# Patient Record
Sex: FEMALE | Race: WHITE | NOT HISPANIC OR LATINO
[De-identification: names, ages, dates, MRNs, and addresses within clinical notes are randomized per-mention and may not be internally consistent; named-entity substitution may affect disease eponyms.]

---

## 2017-03-22 ENCOUNTER — APPOINTMENT (OUTPATIENT)
Dept: CARDIOLOGY | Facility: CLINIC | Age: 82
End: 2017-03-22

## 2017-07-24 PROBLEM — Z00.00 ENCOUNTER FOR PREVENTIVE HEALTH EXAMINATION: Status: ACTIVE | Noted: 2017-07-24

## 2017-08-10 ENCOUNTER — APPOINTMENT (OUTPATIENT)
Dept: CARDIOLOGY | Facility: CLINIC | Age: 82
End: 2017-08-10
Payer: MEDICARE

## 2017-08-10 ENCOUNTER — APPOINTMENT (OUTPATIENT)
Dept: CARDIOLOGY | Facility: CLINIC | Age: 82
End: 2017-08-10

## 2017-08-10 PROCEDURE — 93306 TTE W/DOPPLER COMPLETE: CPT

## 2017-08-14 ENCOUNTER — APPOINTMENT (OUTPATIENT)
Dept: CARDIOLOGY | Facility: CLINIC | Age: 82
End: 2017-08-14
Payer: MEDICARE

## 2017-08-14 PROCEDURE — 99214 OFFICE O/P EST MOD 30 MIN: CPT

## 2017-09-06 ENCOUNTER — APPOINTMENT (OUTPATIENT)
Dept: CARDIOLOGY | Facility: CLINIC | Age: 82
End: 2017-09-06
Payer: MEDICARE

## 2017-09-06 PROCEDURE — A9502: CPT

## 2017-09-06 PROCEDURE — 93015 CV STRESS TEST SUPVJ I&R: CPT

## 2017-09-06 PROCEDURE — 78452 HT MUSCLE IMAGE SPECT MULT: CPT

## 2017-09-13 ENCOUNTER — APPOINTMENT (OUTPATIENT)
Dept: CARDIOLOGY | Facility: CLINIC | Age: 82
End: 2017-09-13
Payer: MEDICARE

## 2017-09-13 PROCEDURE — 99214 OFFICE O/P EST MOD 30 MIN: CPT

## 2017-10-12 ENCOUNTER — APPOINTMENT (OUTPATIENT)
Dept: CARDIOLOGY | Facility: CLINIC | Age: 82
End: 2017-10-12

## 2018-01-23 ENCOUNTER — RX RENEWAL (OUTPATIENT)
Age: 83
End: 2018-01-23

## 2018-01-23 ENCOUNTER — RECORD ABSTRACTING (OUTPATIENT)
Age: 83
End: 2018-01-23

## 2018-02-02 ENCOUNTER — MOBILE ON CALL (OUTPATIENT)
Age: 83
End: 2018-02-02

## 2018-03-07 ENCOUNTER — APPOINTMENT (OUTPATIENT)
Dept: CARDIOLOGY | Facility: CLINIC | Age: 83
End: 2018-03-07
Payer: MEDICARE

## 2018-03-07 VITALS
BODY MASS INDEX: 24.8 KG/M2 | HEART RATE: 64 BPM | WEIGHT: 140 LBS | RESPIRATION RATE: 16 BRPM | HEIGHT: 63 IN | SYSTOLIC BLOOD PRESSURE: 100 MMHG | DIASTOLIC BLOOD PRESSURE: 60 MMHG | OXYGEN SATURATION: 98 %

## 2018-03-07 DIAGNOSIS — Z87.898 PERSONAL HISTORY OF OTHER SPECIFIED CONDITIONS: ICD-10-CM

## 2018-03-07 DIAGNOSIS — Z80.0 FAMILY HISTORY OF MALIGNANT NEOPLASM OF DIGESTIVE ORGANS: ICD-10-CM

## 2018-03-07 DIAGNOSIS — Z78.9 OTHER SPECIFIED HEALTH STATUS: ICD-10-CM

## 2018-03-07 DIAGNOSIS — Z86.39 PERSONAL HISTORY OF OTHER ENDOCRINE, NUTRITIONAL AND METABOLIC DISEASE: ICD-10-CM

## 2018-03-07 DIAGNOSIS — Z82.0 FAMILY HISTORY OF EPILEPSY AND OTHER DISEASES OF THE NERVOUS SYSTEM: ICD-10-CM

## 2018-03-07 DIAGNOSIS — R25.2 CRAMP AND SPASM: ICD-10-CM

## 2018-03-07 DIAGNOSIS — Z86.79 PERSONAL HISTORY OF OTHER DISEASES OF THE CIRCULATORY SYSTEM: ICD-10-CM

## 2018-03-07 DIAGNOSIS — I77.810 THORACIC AORTIC ECTASIA: ICD-10-CM

## 2018-03-07 PROCEDURE — 99214 OFFICE O/P EST MOD 30 MIN: CPT

## 2018-03-07 RX ORDER — LATANOPROST/PF 0.005 %
0.01 DROPS OPHTHALMIC (EYE)
Refills: 0 | Status: ACTIVE | COMMUNITY

## 2018-03-07 RX ORDER — LEVOTHYROXINE SODIUM 0.12 MG/1
125 TABLET ORAL DAILY
Refills: 0 | Status: ACTIVE | COMMUNITY

## 2018-04-02 ENCOUNTER — MEDICATION RENEWAL (OUTPATIENT)
Age: 83
End: 2018-04-02

## 2018-04-05 ENCOUNTER — MEDICATION RENEWAL (OUTPATIENT)
Age: 83
End: 2018-04-05

## 2018-04-25 ENCOUNTER — CLINICAL ADVICE (OUTPATIENT)
Age: 83
End: 2018-04-25

## 2018-07-25 PROBLEM — Z78.9 ALCOHOL USE: Status: ACTIVE | Noted: 2018-03-07

## 2018-09-19 ENCOUNTER — APPOINTMENT (OUTPATIENT)
Dept: CARDIOLOGY | Facility: CLINIC | Age: 83
End: 2018-09-19
Payer: MEDICARE

## 2018-09-19 ENCOUNTER — NON-APPOINTMENT (OUTPATIENT)
Age: 83
End: 2018-09-19

## 2018-09-19 VITALS
SYSTOLIC BLOOD PRESSURE: 116 MMHG | OXYGEN SATURATION: 98 % | HEART RATE: 60 BPM | BODY MASS INDEX: 24.1 KG/M2 | HEIGHT: 63 IN | WEIGHT: 136 LBS | DIASTOLIC BLOOD PRESSURE: 62 MMHG

## 2018-09-19 PROCEDURE — 93306 TTE W/DOPPLER COMPLETE: CPT

## 2018-09-19 PROCEDURE — 99215 OFFICE O/P EST HI 40 MIN: CPT

## 2018-09-19 PROCEDURE — 93000 ELECTROCARDIOGRAM COMPLETE: CPT

## 2018-09-19 RX ORDER — ACETAMINOPHEN 500 MG
1000 TABLET ORAL
Refills: 0 | Status: ACTIVE | COMMUNITY

## 2018-09-19 RX ORDER — VITS A,C,E/LUTEIN/MINERALS 300MCG-200
TABLET ORAL
Refills: 0 | Status: ACTIVE | COMMUNITY

## 2018-09-19 RX ORDER — MULTIVITAMIN
TABLET ORAL
Refills: 0 | Status: ACTIVE | COMMUNITY

## 2018-09-19 RX ORDER — METOPROLOL TARTRATE 25 MG/1
25 TABLET, FILM COATED ORAL TWICE DAILY
Qty: 180 | Refills: 1 | Status: DISCONTINUED | COMMUNITY
Start: 1900-01-01 | End: 2018-09-19

## 2018-09-21 ENCOUNTER — RX RENEWAL (OUTPATIENT)
Age: 83
End: 2018-09-21

## 2018-10-04 ENCOUNTER — APPOINTMENT (OUTPATIENT)
Dept: CARDIOLOGY | Facility: CLINIC | Age: 83
End: 2018-10-04

## 2018-10-05 ENCOUNTER — APPOINTMENT (OUTPATIENT)
Dept: CARDIOLOGY | Facility: CLINIC | Age: 83
End: 2018-10-05
Payer: MEDICARE

## 2018-10-05 VITALS
DIASTOLIC BLOOD PRESSURE: 58 MMHG | BODY MASS INDEX: 24.1 KG/M2 | SYSTOLIC BLOOD PRESSURE: 116 MMHG | WEIGHT: 136 LBS | HEART RATE: 62 BPM | HEIGHT: 63 IN

## 2018-10-05 PROCEDURE — 99214 OFFICE O/P EST MOD 30 MIN: CPT

## 2018-10-23 ENCOUNTER — APPOINTMENT (OUTPATIENT)
Dept: CARDIOLOGY | Facility: CLINIC | Age: 83
End: 2018-10-23
Payer: MEDICARE

## 2018-10-23 VITALS
DIASTOLIC BLOOD PRESSURE: 58 MMHG | BODY MASS INDEX: 24.63 KG/M2 | HEART RATE: 66 BPM | SYSTOLIC BLOOD PRESSURE: 102 MMHG | HEIGHT: 63 IN | WEIGHT: 139 LBS

## 2018-10-23 PROCEDURE — 99214 OFFICE O/P EST MOD 30 MIN: CPT

## 2018-10-29 ENCOUNTER — MEDICATION RENEWAL (OUTPATIENT)
Age: 83
End: 2018-10-29

## 2019-02-28 ENCOUNTER — APPOINTMENT (OUTPATIENT)
Dept: CARDIOLOGY | Facility: CLINIC | Age: 84
End: 2019-02-28
Payer: MEDICARE

## 2019-02-28 VITALS
WEIGHT: 145 LBS | DIASTOLIC BLOOD PRESSURE: 64 MMHG | HEART RATE: 62 BPM | OXYGEN SATURATION: 96 % | HEIGHT: 63 IN | BODY MASS INDEX: 25.69 KG/M2 | SYSTOLIC BLOOD PRESSURE: 102 MMHG

## 2019-02-28 DIAGNOSIS — Z01.812 ENCOUNTER FOR PREPROCEDURAL LABORATORY EXAMINATION: ICD-10-CM

## 2019-02-28 PROCEDURE — 0296T: CPT

## 2019-02-28 PROCEDURE — 99214 OFFICE O/P EST MOD 30 MIN: CPT

## 2019-02-28 RX ORDER — CYANOCOBALAMIN (VITAMIN B-12) 1000 MCG
TABLET ORAL
Refills: 0 | Status: DISCONTINUED | COMMUNITY
End: 2019-02-28

## 2019-02-28 NOTE — HISTORY OF PRESENT ILLNESS
[FreeTextEntry1] : 83-year-old comes in for followup urgent consultation in presence of\par Continued intermittent shortness of breath wounds associated with feeling very dizzy, lightheaded, and fainting. She did not have any chest pressure or heaviness pain or palpitations. Symptoms resolved very quickly. Her shortness of breath is with exertion, as well as at rest. She has no PND, orthopnea, or pedal edema. No recent changes in medications.\par No claudication pain.\par Hospital admission for a cardiovascular reason recently.\par Stable. Weight.\par Recent hospitalization for gallbladder problems.\par \par Medical history includes\par Thoracic, aortic ectasia.\par Essential hypertension. Preserved systolic function. Nonsmoker. No renal insufficiency. No congestive heart failure.\par Coronary artery disease with coronary angiography recently showing proximal LAD moderate disease. On medical management.\par Mild to moderate mitral regurgitation.\par Hyperlipidemia. On statin therapy.\par Hypothyroidism.

## 2019-02-28 NOTE — DISCUSSION/SUMMARY
[FreeTextEntry1] : #1 coronary artery disease. Moderate proximal LAD. on Medical management. Continue aspirin. Continue atorvastatin. \par Coronary angiogram again reviewed. She has moderate proximal LAD disease. On medical management. Unable to decide whether any of her symptoms mainly of shortness of breath is any way related to LAD disease. Fractional flow reserve was not done during the coronary angiography in 2017 and 2018.\par Considering at present symptoms of dizziness and fainting. I have recommended her to have 2 weeks of ZIo patch to rule out any significant arrhythmia. If no significant arrhythmia in association with her intermittent shortness of breath, then would like her to have coronary angiography again, weight if needed FFR and intervention.\par If any resting symptoms. She will call 911.\par #2 essential hypertension. Well controlled. Continue angiotensin receptor blocker. Low-salt diet.\par #3 hyperlipidemia. Continue atorvastatin at 40 mg at present. Tolerating it well. Low saturated fat, carbohydrate intake.\par #4 mild-to-moderate mitral regurgitation. Thoracic aortic ectasia. Followup by echocardiogram is ordered. So far preserved systolic function, stable. LV dimension.\par \par Counseling regarding low saturated fat, salt and carbohydrate intake was reviewed. Active lifestyle and regular. Exercise along with weight management is advised.\par \par All the above were at length reviewed. Answered all the questions. Thank you very much for this kind referral. Please do not hesitate to give me a call for any question.\par Part of this transcription was done with voice recognition software and phonetically similar errors are common. I apologize for that. Please donot hesitate to call for any questions due to above.\par \par Followup in 1 month or for any change in her symptoms

## 2019-02-28 NOTE — REVIEW OF SYSTEMS
[Fever] : no fever [Headache] : no headache [Recent Weight Gain (___ Lbs)] : no recent weight gain [Chills] : no chills [Feeling Fatigued] : feeling fatigued [Recent Weight Loss (___ Lbs)] : no recent weight loss [Dyspnea on exertion] : dyspnea during exertion [Chest  Pressure] : no chest pressure [Chest Pain] : chest pain [Lower Ext Edema] : no extremity edema [Leg Claudication] : no intermittent leg claudication [Palpitations] : no palpitations [Joint Pain] : no joint pain [Joint Swelling] : no joint swelling [Joint Stiffness] : joint stiffness [Muscle Cramps] : no muscle cramps [Limb Weakness (Paresis)] : no limb weakness [see HPI] : see HPI [Dizziness] : dizziness [Tremor] : no tremor was seen [Numbness (Hypesthesia)] : no numbness [Convulsions] : no convulsions [Tingling (Paresthesia)] : no tingling [Negative] : Heme/Lymph

## 2019-02-28 NOTE — REASON FOR VISIT
[Acute Exacerbation] : an acute exacerbation of [Coronary Artery Disease] : coronary artery disease [Dyspnea] : dyspnea [Syncope] : syncope

## 2019-02-28 NOTE — CARDIOLOGY SUMMARY
[No Exercise Ind Arr] : no exercise induced arrhythmias [No Symptoms] : no Symptoms [___] : [unfilled] [LVEF ___%] : LVEF [unfilled]% [Mild] : mild LV dysfunction [None] : no pulmonary hypertension [Normal] : normal LA size [Moderate] : moderate mitral regurgitation

## 2019-02-28 NOTE — PHYSICAL EXAM
[General Appearance - Well Developed] : well developed [Normal Appearance] : normal appearance [Well Groomed] : well groomed [General Appearance - Well Nourished] : well nourished [No Deformities] : no deformities [General Appearance - In No Acute Distress] : no acute distress [Normal Conjunctiva] : the conjunctiva exhibited no abnormalities [Eyelids - No Xanthelasma] : the eyelids demonstrated no xanthelasmas [No Oral Pallor] : no oral pallor [Normal Jugular Venous A Waves Present] : normal jugular venous A waves present [Normal Jugular Venous V Waves Present] : normal jugular venous V waves present [No Jugular Venous Gill A Waves] : no jugular venous gill A waves [Respiration, Rhythm And Depth] : normal respiratory rhythm and effort [Exaggerated Use Of Accessory Muscles For Inspiration] : no accessory muscle use [Auscultation Breath Sounds / Voice Sounds] : lungs were clear to auscultation bilaterally [Heart Rate And Rhythm] : heart rate and rhythm were normal [Heart Sounds] : normal S1 and S2 [Arterial Pulses Normal] : the arterial pulses were normal [Edema] : no peripheral edema present [Veins - Varicosity Changes] : no varicosital changes were noted in the lower extremities [FreeTextEntry1] : watson 1/6 at the base [Abdomen Soft] : soft [Abnormal Walk] : normal gait [Nail Clubbing] : no clubbing of the fingernails [Cyanosis, Localized] : no localized cyanosis [Skin Color & Pigmentation] : normal skin color and pigmentation [] : no rash [Oriented To Time, Place, And Person] : oriented to person, place, and time [Affect] : the affect was normal [Mood] : the mood was normal [No Anxiety] : not feeling anxious

## 2019-03-22 PROCEDURE — 0298T: CPT

## 2019-03-27 ENCOUNTER — APPOINTMENT (OUTPATIENT)
Dept: CARDIOLOGY | Facility: CLINIC | Age: 84
End: 2019-03-27
Payer: MEDICARE

## 2019-03-27 VITALS
SYSTOLIC BLOOD PRESSURE: 110 MMHG | WEIGHT: 143 LBS | HEART RATE: 62 BPM | DIASTOLIC BLOOD PRESSURE: 64 MMHG | BODY MASS INDEX: 25.34 KG/M2 | HEIGHT: 63 IN | OXYGEN SATURATION: 98 %

## 2019-03-27 PROCEDURE — 99214 OFFICE O/P EST MOD 30 MIN: CPT

## 2019-03-27 NOTE — PHYSICAL EXAM
[General Appearance - Well Developed] : well developed [Normal Appearance] : normal appearance [Well Groomed] : well groomed [General Appearance - Well Nourished] : well nourished [No Deformities] : no deformities [General Appearance - In No Acute Distress] : no acute distress [Normal Conjunctiva] : the conjunctiva exhibited no abnormalities [Eyelids - No Xanthelasma] : the eyelids demonstrated no xanthelasmas [No Oral Pallor] : no oral pallor [Normal Jugular Venous A Waves Present] : normal jugular venous A waves present [Normal Jugular Venous V Waves Present] : normal jugular venous V waves present [No Jugular Venous Gill A Waves] : no jugular venous gill A waves [Respiration, Rhythm And Depth] : normal respiratory rhythm and effort [Exaggerated Use Of Accessory Muscles For Inspiration] : no accessory muscle use [Auscultation Breath Sounds / Voice Sounds] : lungs were clear to auscultation bilaterally [Heart Rate And Rhythm] : heart rate and rhythm were normal [Heart Sounds] : normal S1 and S2 [Arterial Pulses Normal] : the arterial pulses were normal [Edema] : no peripheral edema present [Veins - Varicosity Changes] : no varicosital changes were noted in the lower extremities [Abdomen Soft] : soft [Abnormal Walk] : normal gait [Nail Clubbing] : no clubbing of the fingernails [Cyanosis, Localized] : no localized cyanosis [Skin Color & Pigmentation] : normal skin color and pigmentation [] : no rash [Oriented To Time, Place, And Person] : oriented to person, place, and time [Affect] : the affect was normal [Mood] : the mood was normal [No Anxiety] : not feeling anxious [FreeTextEntry1] : watson 1/6 at the base

## 2019-03-27 NOTE — REVIEW OF SYSTEMS
[Feeling Fatigued] : feeling fatigued [Shortness Of Breath] : shortness of breath [Dyspnea on exertion] : dyspnea during exertion [Joint Stiffness] : joint stiffness [see HPI] : see HPI [Dizziness] : dizziness [Negative] : Heme/Lymph [Fever] : no fever [Headache] : no headache [Recent Weight Gain (___ Lbs)] : no recent weight gain [Chills] : no chills [Recent Weight Loss (___ Lbs)] : no recent weight loss [Chest  Pressure] : no chest pressure [Chest Pain] : no chest pain [Lower Ext Edema] : no extremity edema [Leg Claudication] : no intermittent leg claudication [Palpitations] : no palpitations [Joint Pain] : no joint pain [Joint Swelling] : no joint swelling [Muscle Cramps] : no muscle cramps [Limb Weakness (Paresis)] : no limb weakness [Tremor] : no tremor was seen [Numbness (Hypesthesia)] : no numbness [Convulsions] : no convulsions [Tingling (Paresthesia)] : no tingling

## 2019-03-27 NOTE — REASON FOR VISIT
[Follow-Up - Clinic] : a clinic follow-up of [Coronary Artery Disease] : coronary artery disease [Dyspnea] : dyspnea [Syncope] : syncope [FreeTextEntry2] : CAD.  SOB

## 2019-03-27 NOTE — ASSESSMENT
[FreeTextEntry1] : Besides coronary angiography, echocardiogram, myocardial perfusion scan as reviewed above.\par Labs February 3, 2018 had shown stable CBC, PSA, and a CMP. LDL 89. HDL 64. Triglycerides 72. Total cholesterol 167\par \par EKG Indication coronary artery disease, being dictation. Sinus bradycardia.

## 2019-03-27 NOTE — DISCUSSION/SUMMARY
[FreeTextEntry1] : #1 coronary artery disease. Moderate proximal LAD.  On medical management. Continue aspirin. Continue atorvastatin.  Worsening shortness of breath.  Questionable relation to LAD disease. Fractional flow reserve was not done during the coronary angiography in 2017 and 2018.  Recommend Rt and Lt cardiac catheterization with FFR to further evaluate LAD as culprit artery for symptoms as well as Rt heart pressures etc.  Brief NSVT noted on ambulatory monitoring.  If any resting symptoms. She will call 911.\par \par #2 essential hypertension. Well controlled. Continue angiotensin receptor blocker. Low-salt diet.\par \par #3 hyperlipidemia. Continue atorvastatin at 40 mg at present. Tolerating it well. Low saturated fat, carbohydrate intake.\par \par #4 mild-to-moderate mitral regurgitation. Thoracic aortic ectasia. Followup by echocardiogram is ordered. So far preserved systolic function, stable. LV dimension.\par \par Counseling regarding low saturated fat, salt and carbohydrate intake was reviewed. Active lifestyle and regular. Exercise along with weight management is advised.\par \par All the above were at length reviewed. Answered all the questions. Thank you very much for this kind referral. Please do not hesitate to give me a call for any question.\par Part of this transcription was done with voice recognition software and phonetically similar errors are common. I apologize for that. Please donot hesitate to call for any questions due to above.\par \par Followup after cardiac catheterization.

## 2019-03-27 NOTE — HISTORY OF PRESENT ILLNESS
[FreeTextEntry1] : 83-year-old comes in for followup in presence of\par Continued intermittent shortness of breath. Seems to be worsening.  Previously only with hills, now with flat ground.  Associated with feeling very dizzy, lightheaded, and fainting. She did not have any chest pressure or heaviness pain or palpitations. Symptoms resolve very quickly. Her shortness of breath is with exertion, as well as at rest. She has no PND, orthopnea, or pedal edema. No recent changes in medications.  Weight stable.\par No claudication pain.  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 Preserved systolic function. Nonsmoker. No renal insufficiency. No congestive heart failure.\par Coronary artery disease with coronary angiography recently showing proximal LAD moderate disease. On medical management.\par Mild to moderate mitral regurgitation.\par Hyperlipidemia. On statin therapy.\par Hypothyroidism.

## 2019-04-02 ENCOUNTER — OUTPATIENT (OUTPATIENT)
Dept: OUTPATIENT SERVICES | Facility: HOSPITAL | Age: 84
LOS: 1 days | End: 2019-04-02
Payer: MEDICARE

## 2019-04-02 PROCEDURE — 92978 ENDOLUMINL IVUS OCT C 1ST: CPT | Mod: 26,LD

## 2019-04-02 PROCEDURE — 93460 R&L HRT ART/VENTRICLE ANGIO: CPT | Mod: 26

## 2019-04-02 PROCEDURE — 93571 IV DOP VEL&/PRESS C FLO 1ST: CPT | Mod: 26

## 2019-04-05 ENCOUNTER — APPOINTMENT (OUTPATIENT)
Dept: CARDIOLOGY | Facility: CLINIC | Age: 84
End: 2019-04-05
Payer: MEDICARE

## 2019-04-05 VITALS
HEIGHT: 63 IN | OXYGEN SATURATION: 97 % | SYSTOLIC BLOOD PRESSURE: 108 MMHG | DIASTOLIC BLOOD PRESSURE: 60 MMHG | HEART RATE: 70 BPM | BODY MASS INDEX: 24.8 KG/M2 | WEIGHT: 140 LBS

## 2019-04-05 PROCEDURE — 99214 OFFICE O/P EST MOD 30 MIN: CPT

## 2019-04-21 ENCOUNTER — RX RENEWAL (OUTPATIENT)
Age: 84
End: 2019-04-21

## 2019-04-23 ENCOUNTER — APPOINTMENT (OUTPATIENT)
Dept: CARDIOLOGY | Facility: CLINIC | Age: 84
End: 2019-04-23

## 2019-08-05 ENCOUNTER — CLINICAL ADVICE (OUTPATIENT)
Age: 84
End: 2019-08-05

## 2019-08-07 ENCOUNTER — MEDICATION RENEWAL (OUTPATIENT)
Age: 84
End: 2019-08-07

## 2019-08-19 ENCOUNTER — APPOINTMENT (OUTPATIENT)
Dept: CARDIOLOGY | Facility: CLINIC | Age: 84
End: 2019-08-19
Payer: MEDICARE

## 2019-08-19 PROCEDURE — 93015 CV STRESS TEST SUPVJ I&R: CPT

## 2019-10-09 ENCOUNTER — APPOINTMENT (OUTPATIENT)
Dept: CARDIOLOGY | Facility: CLINIC | Age: 84
End: 2019-10-09
Payer: MEDICARE

## 2019-10-09 VITALS
HEART RATE: 66 BPM | HEIGHT: 63 IN | SYSTOLIC BLOOD PRESSURE: 124 MMHG | WEIGHT: 144 LBS | DIASTOLIC BLOOD PRESSURE: 62 MMHG | BODY MASS INDEX: 25.52 KG/M2

## 2019-10-09 PROCEDURE — 99214 OFFICE O/P EST MOD 30 MIN: CPT

## 2019-10-09 RX ORDER — ALENDRONATE SODIUM 35 MG/1
TABLET ORAL
Refills: 0 | Status: ACTIVE | COMMUNITY

## 2019-10-09 RX ORDER — METOPROLOL TARTRATE 25 MG/1
25 TABLET, FILM COATED ORAL
Qty: 180 | Refills: 1 | Status: DISCONTINUED | COMMUNITY
Start: 2019-04-21 | End: 2019-10-09

## 2019-10-09 RX ORDER — METOPROLOL TARTRATE 25 MG/1
25 TABLET, FILM COATED ORAL DAILY
Refills: 0 | Status: DISCONTINUED | COMMUNITY
End: 2019-10-09

## 2019-10-09 NOTE — REASON FOR VISIT
[Follow-Up - Clinic] : a clinic follow-up of [Coronary Artery Disease] : coronary artery disease [Dyspnea] : dyspnea [Hyperlipidemia] : hyperlipidemia [Syncope] : syncope [FreeTextEntry1] : 84-year-old female comes in for followup consultation after recent stress echocardiogram and EEG for unexplained episodes of hunger.\par She is being followed with neurologist. So far. EEG was negative. Stress echocardiogram did not reveal any significant cardiac arrhythmias or hypotension.\par Her symptoms have remained stable without any episode of near syncopal or syncopal event.\par She has stable exertional dyspnea.\par She has no significant chest pain.\par . She has no PND, orthopnea, or pedal edema. No recent changes in medications.\par No claudication pain.\par Hospital admission for a cardiovascular reason recently.\par Stable. Weight.\par

## 2019-10-09 NOTE — HISTORY OF PRESENT ILLNESS
[FreeTextEntry1] : Medical history includes\par Thoracic, aortic ectasia.\par Essential hypertension. Preserved systolic function. Nonsmoker. No renal insufficiency. No congestive heart failure.\par Coronary artery disease with coronary angiography recently showing proximal LAD moderate disease. On medical management. multile angiograms. Ifr normal.\par Mild to moderate mitral regurgitation.\par Hyperlipidemia. On statin therapy.\par Hypothyroidism.

## 2019-10-09 NOTE — PHYSICAL EXAM
[General Appearance - Well Developed] : well developed [Normal Appearance] : normal appearance [Well Groomed] : well groomed [No Deformities] : no deformities [General Appearance - Well Nourished] : well nourished [General Appearance - In No Acute Distress] : no acute distress [Normal Conjunctiva] : the conjunctiva exhibited no abnormalities [Eyelids - No Xanthelasma] : the eyelids demonstrated no xanthelasmas [No Oral Pallor] : no oral pallor [Normal Jugular Venous A Waves Present] : normal jugular venous A waves present [Normal Jugular Venous V Waves Present] : normal jugular venous V waves present [No Jugular Venous Gill A Waves] : no jugular venous gill A waves [Respiration, Rhythm And Depth] : normal respiratory rhythm and effort [Auscultation Breath Sounds / Voice Sounds] : lungs were clear to auscultation bilaterally [Exaggerated Use Of Accessory Muscles For Inspiration] : no accessory muscle use [Heart Rate And Rhythm] : heart rate and rhythm were normal [Heart Sounds] : normal S1 and S2 [Arterial Pulses Normal] : the arterial pulses were normal [Veins - Varicosity Changes] : no varicosital changes were noted in the lower extremities [Edema] : no peripheral edema present [Abdomen Soft] : soft [Abnormal Walk] : normal gait [FreeTextEntry1] : watson 1/6 at the base [Cyanosis, Localized] : no localized cyanosis [Nail Clubbing] : no clubbing of the fingernails [Skin Color & Pigmentation] : normal skin color and pigmentation [] : no rash [Oriented To Time, Place, And Person] : oriented to person, place, and time [Mood] : the mood was normal [Affect] : the affect was normal [No Anxiety] : not feeling anxious

## 2019-10-09 NOTE — CARDIOLOGY SUMMARY
[No Exercise Ind Arr] : no exercise induced arrhythmias [No Symptoms] : no Symptoms [___] : [unfilled] [LVEF ___%] : LVEF [unfilled]% [Mild] : mild LV dysfunction [None] : no pulmonary hypertension [Moderate] : moderate mitral regurgitation [Normal] : normal LA size

## 2019-10-09 NOTE — REVIEW OF SYSTEMS
[Fever] : no fever [Recent Weight Gain (___ Lbs)] : no recent weight gain [Headache] : no headache [Feeling Fatigued] : feeling fatigued [Chills] : no chills [Recent Weight Loss (___ Lbs)] : no recent weight loss [Chest  Pressure] : no chest pressure [Dyspnea on exertion] : dyspnea during exertion [Chest Pain] : chest pain [Leg Claudication] : no intermittent leg claudication [Lower Ext Edema] : no extremity edema [Palpitations] : no palpitations [Joint Pain] : no joint pain [Joint Swelling] : no joint swelling [Joint Stiffness] : joint stiffness [Muscle Cramps] : no muscle cramps [Limb Weakness (Paresis)] : no limb weakness [see HPI] : see HPI [Dizziness] : dizziness [Tremor] : no tremor was seen [Numbness (Hypesthesia)] : no numbness [Convulsions] : no convulsions [Tingling (Paresthesia)] : no tingling [Negative] : Endocrine

## 2019-10-09 NOTE — ASSESSMENT
[FreeTextEntry1] : Besides coronary angiography, echocardiogram, myocardial perfusion scan as reviewed above.\par Labs February 3, 2018 had shown stable CBC, PSA, and a CMP. LDL 89. HDL 64. Triglycerides 72. Total cholesterol 167\par EKG ordered and interpreted by me. Indication coronary artery disease, indication. Sinus bradycardia.\par \par Reviewed on October 9, 2019\par Stress echocardiogram August 19, 2019. Ischemic EKG after 9 minutes of modified Sebastián protocol. 10 METs of workload. Saturation normal. No significant cardiac arrhythmias. Stable. Blood pressure response.

## 2019-10-09 NOTE — DISCUSSION/SUMMARY
[FreeTextEntry1] : #1 coronary artery disease. Moderate proximal LAD. on Medical management. Continue aspirin. Continue atorvastatin. Low dose of beta blockers, and isosorbide is helping her to stay without anginal symptoms. I have changed her metoprolol tartrate to metoprolol succinate 25 g once daily as she is taking her tartrate on the once a day\par #2 essential hypertension. Well controlled. Continue angiotensin receptor blocker. Low-salt diet.\par #3 hyperlipidemia. Continue atorvastatin at 40 mg at present. Tolerating it well. Low saturated fat, carbohydrate intake.\par #4 mild-to-moderate mitral regurgitation. Thoracic aortic ectasia. Followup by echocardiogram In future\par \par Counseling regarding low saturated fat, salt and carbohydrate intake was reviewed. Active lifestyle and regular. Exercise along with weight management is advised.\par \par All the above were at length reviewed. Answered all the questions. Thank you very much for this kind referral. Please do not hesitate to give me a call for any question.\par Part of this transcription was done with voice recognition software and phonetically similar errors are common. I apologize for that. Please donot hesitate to call for any questions due to above.\par \par Followup in 6 months or for any change in her symptoms

## 2020-06-01 NOTE — ASSESSMENT
FreeTextEntry1] : Besides coronary angiography, echocardiogram, myocardial perfusion scan as reviewed above.\par Labs February 3, 2018 had shown stable CBC, PSA, and a CMP. LDL 89. HDL 64. Triglycerides 72. Total cholesterol 167\par \par EKG ordered and interpreted by me. Indication coronary artery disease, being dictation. Sinus bradycardia.

## 2020-06-15 ENCOUNTER — APPOINTMENT (OUTPATIENT)
Dept: CARDIOLOGY | Facility: CLINIC | Age: 85
End: 2020-06-15

## 2020-08-02 ENCOUNTER — RX RENEWAL (OUTPATIENT)
Age: 85
End: 2020-08-02

## 2020-08-06 ENCOUNTER — NON-APPOINTMENT (OUTPATIENT)
Age: 85
End: 2020-08-06

## 2020-08-06 ENCOUNTER — APPOINTMENT (OUTPATIENT)
Dept: CARDIOLOGY | Facility: CLINIC | Age: 85
End: 2020-08-06
Payer: MEDICARE

## 2020-08-06 VITALS
HEART RATE: 66 BPM | DIASTOLIC BLOOD PRESSURE: 62 MMHG | HEIGHT: 63 IN | WEIGHT: 140 LBS | TEMPERATURE: 98.6 F | BODY MASS INDEX: 24.8 KG/M2 | SYSTOLIC BLOOD PRESSURE: 128 MMHG | OXYGEN SATURATION: 95 %

## 2020-08-06 PROCEDURE — 99214 OFFICE O/P EST MOD 30 MIN: CPT

## 2020-08-06 PROCEDURE — 93000 ELECTROCARDIOGRAM COMPLETE: CPT

## 2020-08-06 NOTE — REVIEW OF SYSTEMS
[Fever] : no fever [Chills] : no chills [Recent Weight Gain (___ Lbs)] : no recent weight gain [Headache] : no headache [Dyspnea on exertion] : dyspnea during exertion [Recent Weight Loss (___ Lbs)] : no recent weight loss [Feeling Fatigued] : feeling fatigued [Chest Pain] : chest pain [Chest  Pressure] : no chest pressure [Leg Claudication] : no intermittent leg claudication [Lower Ext Edema] : no extremity edema [Palpitations] : no palpitations [Joint Swelling] : no joint swelling [Joint Pain] : no joint pain [Joint Stiffness] : joint stiffness [Muscle Cramps] : no muscle cramps [Limb Weakness (Paresis)] : no limb weakness [see HPI] : see HPI [Convulsions] : no convulsions [Numbness (Hypesthesia)] : no numbness [Tremor] : no tremor was seen [Dizziness] : dizziness [Tingling (Paresthesia)] : no tingling [Negative] : Heme/Lymph

## 2020-08-06 NOTE — DISCUSSION/SUMMARY
[FreeTextEntry1] : 85-year-old female with above medical history and active medical problems as noted below \par #1 coronary artery disease. Moderate proximal LAD. on Medical management. Continue aspirin. Continue atorvastatin. Low dose of beta blockers, and on isosorbide.  Though in presence of postural dizziness without any significant orthostasis and no significant anginal symptoms recently would decrease dose of isosorbide mononitrate to 15 mg daily.  Continue secondary prevention.\par #2 essential hypertension. Well controlled.  Without significant orthostasis while in the office today.  Nonetheless with postural shifts in a blood pressure we will decrease dose of valsartan to 40 mg considering very stable blood pressure readings.  She will keep herself hydrated.  She will call me if there is any change in her symptoms.\par #3 hyperlipidemia. Continue atorvastatin at 40 mg at present. Tolerating it well. Low saturated fat, carbohydrate intake.\par #4 mild-to-moderate mitral regurgitation. Thoracic aortic ectasia. Followup by echocardiogram .  I will review echocardiogram when it is done on the phone.  She will contact me after the test so that appropriate review can be done.\par \par \par Counseling regarding low saturated fat, salt and carbohydrate intake was reviewed. Active lifestyle and regular. Exercise along with weight management is advised.\par \par All the above were at length reviewed. Answered all the questions. Thank you very much for this kind referral. Please do not hesitate to give me a call for any question.\par Part of this transcription was done with voice recognition software and phonetically similar errors are common. I apologize for that. Please donot hesitate to call for any questions due to above.\par \par Followup in 6 months or for any change in her symptoms

## 2020-08-06 NOTE — ASSESSMENT
[FreeTextEntry1] : Besides coronary angiography, echocardiogram, myocardial perfusion scan as reviewed above.\par Labs February 3, 2018 had shown stable CBC, PSA, and a CMP. LDL 89. HDL 64. Triglycerides 72. Total cholesterol 167\par EKG ordered and interpreted by me. Indication coronary artery disease, indication. Sinus bradycardia.\par \par Reviewed on October 9, 2019\par Stress echocardiogram August 19, 2019. Ischemic EKG after 9 minutes of modified Sebastián protocol. 10 METs of workload. Saturation normal. No significant cardiac arrhythmias. Stable. Blood pressure response.\par \par Reviewed on August 6, 2020.\par EKG.  Normal sinus rhythm poor R wave progression.  Normal intervals.\par Labs which were done on July 8, 2020 normal CBC CMP with creatinine 0.67 potassium 4.5 sodium 137 HDL 60 LDL 88 total cholesterol 161 TSH 3.07

## 2020-08-06 NOTE — PHYSICAL EXAM
[Normal Appearance] : normal appearance [Well Groomed] : well groomed [General Appearance - Well Developed] : well developed [No Deformities] : no deformities [General Appearance - Well Nourished] : well nourished [General Appearance - In No Acute Distress] : no acute distress [Normal Conjunctiva] : the conjunctiva exhibited no abnormalities [Eyelids - No Xanthelasma] : the eyelids demonstrated no xanthelasmas [No Oral Pallor] : no oral pallor [Normal Jugular Venous A Waves Present] : normal jugular venous A waves present [Normal Jugular Venous V Waves Present] : normal jugular venous V waves present [No Jugular Venous Gill A Waves] : no jugular venous gill A waves [Respiration, Rhythm And Depth] : normal respiratory rhythm and effort [Auscultation Breath Sounds / Voice Sounds] : lungs were clear to auscultation bilaterally [Exaggerated Use Of Accessory Muscles For Inspiration] : no accessory muscle use [Heart Sounds] : normal S1 and S2 [Heart Rate And Rhythm] : heart rate and rhythm were normal [Arterial Pulses Normal] : the arterial pulses were normal [Edema] : no peripheral edema present [Veins - Varicosity Changes] : no varicosital changes were noted in the lower extremities [FreeTextEntry1] : watson 1/6 at the base [Abdomen Soft] : soft [Abnormal Walk] : normal gait [Cyanosis, Localized] : no localized cyanosis [Nail Clubbing] : no clubbing of the fingernails [] : no rash [Oriented To Time, Place, And Person] : oriented to person, place, and time [Skin Color & Pigmentation] : normal skin color and pigmentation [Affect] : the affect was normal [Mood] : the mood was normal [No Anxiety] : not feeling anxious

## 2020-08-06 NOTE — CARDIOLOGY SUMMARY
[No Symptoms] : no Symptoms [No Exercise Ind Arr] : no exercise induced arrhythmias [___] : [unfilled] [LVEF ___%] : LVEF [unfilled]% [Mild] : mild LV dysfunction [None] : no pulmonary hypertension [Moderate] : moderate mitral regurgitation [Normal] : normal LA size

## 2020-08-06 NOTE — REASON FOR VISIT
[Follow-Up - Clinic] : a clinic follow-up of [Coronary Artery Disease] : coronary artery disease [Dyspnea] : dyspnea [Hyperlipidemia] : hyperlipidemia [Medication Management] : Medication management [Hypertension] : hypertension [Syncope] : syncope [FreeTextEntry1] : 85-year-old female comes in for follow-up consultation today in the office.  Complaint of postural dizziness without loss of consciousness.  She did have a recent fall when she bent down and while getting up quickly lost balance and fell down.  There was no loss of consciousness.  There was no associated palpitation visual disturbances focal weakness incontinence or abnormal movement.  There was no significant injury.  No subsequent recurrence of event.\par She has been hydrating herself well.\par She would like to decrease the dosage of some of her medications considering her blood pressure readings at home are remaining around 120/70.\par \par She has no significant chest pain.\par She has no PND, orthopnea, or pedal edema. No recent changes in medications.\par No claudication pain.\par No recent hospital admission\par Stable. Weight.\par

## 2020-08-26 ENCOUNTER — APPOINTMENT (OUTPATIENT)
Dept: CARDIOLOGY | Facility: CLINIC | Age: 85
End: 2020-08-26
Payer: MEDICARE

## 2020-08-26 PROCEDURE — 93306 TTE W/DOPPLER COMPLETE: CPT

## 2020-09-03 ENCOUNTER — APPOINTMENT (OUTPATIENT)
Dept: CARDIOLOGY | Facility: CLINIC | Age: 85
End: 2020-09-03
Payer: MEDICARE

## 2020-09-03 VITALS
HEART RATE: 70 BPM | DIASTOLIC BLOOD PRESSURE: 83 MMHG | BODY MASS INDEX: 25.76 KG/M2 | SYSTOLIC BLOOD PRESSURE: 129 MMHG | WEIGHT: 140 LBS | HEIGHT: 62 IN

## 2020-09-03 PROCEDURE — 99442: CPT | Mod: 95

## 2020-10-14 ENCOUNTER — RX RENEWAL (OUTPATIENT)
Age: 85
End: 2020-10-14

## 2020-10-20 ENCOUNTER — RX RENEWAL (OUTPATIENT)
Age: 85
End: 2020-10-20

## 2020-11-25 ENCOUNTER — RX RENEWAL (OUTPATIENT)
Age: 85
End: 2020-11-25

## 2020-12-23 ENCOUNTER — RX RENEWAL (OUTPATIENT)
Age: 85
End: 2020-12-23

## 2021-02-19 ENCOUNTER — RX RENEWAL (OUTPATIENT)
Age: 86
End: 2021-02-19

## 2021-06-02 ENCOUNTER — APPOINTMENT (OUTPATIENT)
Dept: CARDIOLOGY | Facility: CLINIC | Age: 86
End: 2021-06-02
Payer: MEDICARE

## 2021-06-02 VITALS
OXYGEN SATURATION: 98 % | BODY MASS INDEX: 26.31 KG/M2 | SYSTOLIC BLOOD PRESSURE: 120 MMHG | TEMPERATURE: 97.7 F | WEIGHT: 143 LBS | HEART RATE: 61 BPM | DIASTOLIC BLOOD PRESSURE: 62 MMHG | HEIGHT: 62 IN

## 2021-06-02 PROCEDURE — 93000 ELECTROCARDIOGRAM COMPLETE: CPT

## 2021-06-02 PROCEDURE — 99214 OFFICE O/P EST MOD 30 MIN: CPT

## 2021-06-02 NOTE — DISCUSSION/SUMMARY
[FreeTextEntry1] : 86-year-old female with above medical history and active medical problems as noted below \par #1 coronary artery disease. Moderate proximal LAD. on Medical management. Continue aspirin. Continue atorvastatin. Low dose of beta blockers, and on isosorbide.  Though in presence of postural dizziness without any significant orthostasis and no significant anginal symptoms recently would decrease dose of isosorbide mononitrate to 15 mg daily.  Continue secondary prevention.\par #2 essential hypertension. Well controlled.  Without significant orthostasis while in the office today.  Nonetheless with postural shifts in a blood pressure we will decrease dose of valsartan to 40 mg considering very stable blood pressure readings.  She will keep herself hydrated.  She will call me if there is any change in her symptoms.\par #3 hyperlipidemia. Continue atorvastatin at 40 mg at present. Tolerating it well. Low saturated fat, carbohydrate intake.\par #4 mild-to-moderate mitral regurgitation. Thoracic aortic ectasia. Followup by echocardiogram .  I will review echocardiogram when it is done on the phone.  She will contact me after the test so that appropriate review can be done.\par \par \par Chronic dyspnea on exertion.  No evidence of fluid overload on physical exam.  In view of LVOT obstruction we will try to avoid diuretics.  Blood pressure is well controlled.  Echocardiogram will be scheduled to rule out any progression of valvular heart disease and evaluate ejection fraction.  Follow-up after echocardiogram.

## 2021-06-02 NOTE — REASON FOR VISIT
[Follow-Up - Clinic] : a clinic follow-up of [Coronary Artery Disease] : coronary artery disease [Dyspnea] : dyspnea [Hyperlipidemia] : hyperlipidemia [Hypertension] : hypertension [Medication Management] : Medication management [Syncope] : syncope [FreeTextEntry1] : 86-year-old female comes in for follow-up consultation today in the office.  Complaint of postural dizziness without loss of consciousness.  She did have a recent fall when she bent down and while getting up quickly lost balance and fell down.  There was no loss of consciousness.  There was no associated palpitation visual disturbances focal weakness incontinence or abnormal movement.  There was no significant injury.  No subsequent recurrence of event.\par She has been hydrating herself well.\par She would like to decrease the dosage of some of her medications considering her blood pressure readings at home are remaining around 120/70.\par \par She has no significant chest pain.\par She has no PND, orthopnea, or pedal edema. No recent changes in medications.\par No claudication pain.\par No recent hospital admission\par Stable. Weight.\par

## 2021-06-02 NOTE — HISTORY OF PRESENT ILLNESS
[FreeTextEntry1] : Medical history includes\par Thoracic, aortic ectasia.\par Essential hypertension. Preserved systolic function. Nonsmoker. No renal insufficiency. No congestive heart failure.\par Coronary artery disease with coronary angiography  showing proximal LAD moderate disease. On medical management. multile angiograms. Ifr normal.\par Mild to moderate mitral regurgitation.\par Hyperlipidemia. On statin therapy.\par Hypothyroidism.\par Chronic dyspnea on exertion.

## 2021-06-02 NOTE — PHYSICAL EXAM
[Well Developed] : well developed [Well Nourished] : well nourished [No Acute Distress] : no acute distress [Normal Venous Pressure] : normal venous pressure [No Carotid Bruit] : no carotid bruit [Normal S1, S2] : normal S1, S2 [No Murmur] : no murmur [No Rub] : no rub [No Gallop] : no gallop [Clear Lung Fields] : clear lung fields [Good Air Entry] : good air entry [No Respiratory Distress] : no respiratory distress  [Soft] : abdomen soft [Non Tender] : non-tender [No Masses/organomegaly] : no masses/organomegaly [Normal Bowel Sounds] : normal bowel sounds [Normal Gait] : normal gait [No Edema] : no edema [No Cyanosis] : no cyanosis [No Clubbing] : no clubbing [No Varicosities] : no varicosities [No Rash] : no rash [No Skin Lesions] : no skin lesions [Moves all extremities] : moves all extremities [No Focal Deficits] : no focal deficits [Normal Speech] : normal speech [Alert and Oriented] : alert and oriented [Normal memory] : normal memory [General Appearance - Well Developed] : well developed [Normal Appearance] : normal appearance [Well Groomed] : well groomed [General Appearance - Well Nourished] : well nourished [No Deformities] : no deformities [General Appearance - In No Acute Distress] : no acute distress [Normal Conjunctiva] : the conjunctiva exhibited no abnormalities [Eyelids - No Xanthelasma] : the eyelids demonstrated no xanthelasmas [No Oral Pallor] : no oral pallor [Normal Jugular Venous A Waves Present] : normal jugular venous A waves present [Normal Jugular Venous V Waves Present] : normal jugular venous V waves present [No Jugular Venous Gill A Waves] : no jugular venous gill A waves [Respiration, Rhythm And Depth] : normal respiratory rhythm and effort [Exaggerated Use Of Accessory Muscles For Inspiration] : no accessory muscle use [Auscultation Breath Sounds / Voice Sounds] : lungs were clear to auscultation bilaterally [Heart Rate And Rhythm] : heart rate and rhythm were normal [Heart Sounds] : normal S1 and S2 [Arterial Pulses Normal] : the arterial pulses were normal [Edema] : no peripheral edema present [Veins - Varicosity Changes] : no varicosital changes were noted in the lower extremities [Abdomen Soft] : soft [Abnormal Walk] : normal gait [Nail Clubbing] : no clubbing of the fingernails [Cyanosis, Localized] : no localized cyanosis [Skin Color & Pigmentation] : normal skin color and pigmentation [] : no rash [Oriented To Time, Place, And Person] : oriented to person, place, and time [Affect] : the affect was normal [Mood] : the mood was normal [No Anxiety] : not feeling anxious [FreeTextEntry1] : [employer] 1/[employer]

## 2021-06-18 ENCOUNTER — NON-APPOINTMENT (OUTPATIENT)
Age: 86
End: 2021-06-18

## 2021-06-18 ENCOUNTER — APPOINTMENT (OUTPATIENT)
Dept: CARDIOLOGY | Facility: CLINIC | Age: 86
End: 2021-06-18
Payer: MEDICARE

## 2021-06-18 VITALS
HEIGHT: 62 IN | BODY MASS INDEX: 26.31 KG/M2 | OXYGEN SATURATION: 96 % | HEART RATE: 68 BPM | DIASTOLIC BLOOD PRESSURE: 60 MMHG | WEIGHT: 143 LBS | SYSTOLIC BLOOD PRESSURE: 114 MMHG

## 2021-06-18 DIAGNOSIS — R42 DIZZINESS AND GIDDINESS: ICD-10-CM

## 2021-06-18 PROCEDURE — 93000 ELECTROCARDIOGRAM COMPLETE: CPT

## 2021-06-18 PROCEDURE — 93306 TTE W/DOPPLER COMPLETE: CPT

## 2021-06-18 PROCEDURE — 99214 OFFICE O/P EST MOD 30 MIN: CPT

## 2021-06-18 RX ORDER — VALSARTAN 80 MG/1
80 TABLET, COATED ORAL DAILY
Qty: 90 | Refills: 1 | Status: DISCONTINUED | COMMUNITY
Start: 2018-09-21 | End: 2021-06-18

## 2021-06-18 RX ORDER — ISOSORBIDE MONONITRATE 30 MG/1
30 TABLET, EXTENDED RELEASE ORAL DAILY
Qty: 45 | Refills: 0 | Status: DISCONTINUED | COMMUNITY
Start: 2021-02-19 | End: 2021-06-18

## 2021-06-18 NOTE — REASON FOR VISIT
[Follow-Up - Clinic] : a clinic follow-up of [Coronary Artery Disease] : coronary artery disease [Dyspnea] : dyspnea [Hyperlipidemia] : hyperlipidemia [Hypertension] : hypertension [Medication Management] : Medication management [Syncope] : syncope

## 2021-06-18 NOTE — CARDIOLOGY SUMMARY
[No Exercise Ind Arr] : no exercise induced arrhythmias [No Symptoms] : no Symptoms [___] : [unfilled] [LVEF ___%] : LVEF [unfilled]% [Mild] : mild LV dysfunction [None] : no pulmonary hypertension [Normal] : normal LA size [Moderate] : moderate mitral regurgitation [de-identified] : 6/18/2021 lvef 65% left ventricular hypertrophy.  Septal thickness 1.8 cm.  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  No significant systolic anterior motion though difficult study precludes me for evaluation it appears LV cavity narrowing due to hyperdynamic function.

## 2021-06-18 NOTE — HISTORY OF PRESENT ILLNESS
[FreeTextEntry1] : 86-year-old female comes in for evaluation management review echocardiogram.\par Since last seen she has been doing fairly well.\par No significant dizziness lightheadedness near syncopal or syncopal event\par She has no significant chest pain.\par She has no PND, orthopnea, or pedal edema. No recent changes in medications.\par No claudication pain.\par No recent hospital admission\par Stable. Weight.\par \par \par \par Medical history includes\par Thoracic, aortic ectasia.\par Essential hypertension. Preserved systolic function. Nonsmoker. No renal insufficiency. No congestive heart failure.\par Coronary artery disease with coronary angiography recently showing proximal LAD moderate disease. On medical management. multile angiograms. Ifr normal.\par Mild to moderate mitral regurgitation.\par Hyperlipidemia. On statin therapy.\par Hypothyroidism.

## 2021-06-18 NOTE — DISCUSSION/SUMMARY
[FreeTextEntry1] : 86-year-old female with above medical history and active medical problems as noted below \par #1 coronary artery disease. Moderate proximal LAD. on Medical management. Continue aspirin. Continue atorvastatin. Low dose of beta blockers,.  Continue secondary prevention.\par #2 essential hypertension. Well controlled.  Low normal blood pressure in both upper extremity taken by me at 110/70.  In presence of significant hyperdynamic LV with LVOT obstruction but asymptomatic I have recommended her\par Increase fluid intake\par Discontinue isosorbide and valsartan which we have decreased significantly\par Increase metoprolol to 50 mg daily\par We will repeat echocardiogram to evaluate for LVOT gradient once I see her blood pressure and heart rate is stable at the next office visit.\par Reviewed risk benefits alternatives high risk symptoms to notify us immediately.\par This is a new finding compared to in the past.\par #3 hyperlipidemia. Continue atorvastatin at 40 mg at present. Tolerating it well. Low saturated fat, carbohydrate intake.\par #4 mild-to-moderate mitral regurgitation. Thoracic aortic ectasia.  Aortic root appears to be stable..  \par \par \par Counseling regarding low saturated fat, salt and carbohydrate intake was reviewed. Active lifestyle and regular. Exercise along with weight management is advised.\par \par All the above were at length reviewed. Answered all the questions. Thank you very much for this kind referral. Please do not hesitate to give me a call for any question.\par Part of this transcription was done with voice recognition software and phonetically similar errors are common. I apologize for that. Please donot hesitate to call for any questions due to above.\par Follow-up is planned in 2 weeks

## 2021-06-18 NOTE — PHYSICAL EXAM
[General Appearance - Well Developed] : well developed [Normal Appearance] : normal appearance [Well Groomed] : well groomed [General Appearance - Well Nourished] : well nourished [No Deformities] : no deformities [General Appearance - In No Acute Distress] : no acute distress [Normal Conjunctiva] : the conjunctiva exhibited no abnormalities [Eyelids - No Xanthelasma] : the eyelids demonstrated no xanthelasmas [No Oral Pallor] : no oral pallor [Normal Jugular Venous A Waves Present] : normal jugular venous A waves present [Normal Jugular Venous V Waves Present] : normal jugular venous V waves present [No Jugular Venous Gill A Waves] : no jugular venous gill A waves [Respiration, Rhythm And Depth] : normal respiratory rhythm and effort [Exaggerated Use Of Accessory Muscles For Inspiration] : no accessory muscle use [Auscultation Breath Sounds / Voice Sounds] : lungs were clear to auscultation bilaterally [Heart Rate And Rhythm] : heart rate and rhythm were normal [Heart Sounds] : normal S1 and S2 [Arterial Pulses Normal] : the arterial pulses were normal [Edema] : no peripheral edema present [Veins - Varicosity Changes] : no varicosital changes were noted in the lower extremities [Abdomen Soft] : soft [Abnormal Walk] : normal gait [Nail Clubbing] : no clubbing of the fingernails [Cyanosis, Localized] : no localized cyanosis [Skin Color & Pigmentation] : normal skin color and pigmentation [] : no rash [Oriented To Time, Place, And Person] : oriented to person, place, and time [Affect] : the affect was normal [Mood] : the mood was normal [No Anxiety] : not feeling anxious [FreeTextEntry1] : [employer]6 [place] [employer]

## 2021-07-07 ENCOUNTER — APPOINTMENT (OUTPATIENT)
Dept: CARDIOLOGY | Facility: CLINIC | Age: 86
End: 2021-07-07
Payer: MEDICARE

## 2021-07-07 VITALS
BODY MASS INDEX: 26.5 KG/M2 | WEIGHT: 144 LBS | SYSTOLIC BLOOD PRESSURE: 138 MMHG | HEART RATE: 60 BPM | DIASTOLIC BLOOD PRESSURE: 62 MMHG | HEIGHT: 62 IN | OXYGEN SATURATION: 95 %

## 2021-07-07 DIAGNOSIS — R93.1 ABNORMAL FINDINGS ON DIAGNOSTIC IMAGING OF HEART AND CORONARY CIRCULATION: ICD-10-CM

## 2021-07-07 PROCEDURE — 99214 OFFICE O/P EST MOD 30 MIN: CPT

## 2021-07-07 PROCEDURE — 93308 TTE F-UP OR LMTD: CPT

## 2021-07-07 NOTE — ASSESSMENT
[FreeTextEntry1] : Besides coronary angiography, echocardiogram, myocardial perfusion scan as reviewed above.\par Labs February 3, 2018 had shown stable CBC, PSA, and a CMP. LDL 89. HDL 64. Triglycerides 72. Total cholesterol 167\par EKG ordered and interpreted by me. Indication coronary artery disease, indication. Sinus bradycardia.\par \par Reviewed on October 9, 2019\par Stress echocardiogram August 19, 2019. Ischemic EKG after 9 minutes of modified Sebastián protocol. 10 METs of workload. Saturation normal. No significant cardiac arrhythmias. Stable. Blood pressure response.\par \par Reviewed on August 6, 2020.\par EKG.  Normal sinus rhythm poor R wave progression.  Normal intervals.\par Labs which were done on July 8, 2020 normal CBC CMP with creatinine 0.67 potassium 4.5 sodium 137 HDL 60 LDL 88 total cholesterol 161 TSH 3.07\par \par Reviewed July 7, 2021.  Echocardiogram from today reviewed.

## 2021-07-07 NOTE — CARDIOLOGY SUMMARY
[No Exercise Ind Arr] : no exercise induced arrhythmias [No Symptoms] : no Symptoms [___] : [unfilled] [LVEF ___%] : LVEF [unfilled]% [Mild] : mild LV dysfunction [None] : no pulmonary hypertension [Normal] : normal LA size [Moderate] : moderate mitral regurgitation [___] : [unfilled] [de-identified] : 6/18/2021 lvef 65% left ventricular hypertrophy.  Septal thickness 1.8 cm.  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  No significant systolic anterior motion though difficult study precludes me for evaluation it appears LV cavity narrowing due to hyperdynamic function.\par \par July 7, 2021.  Hyperdynamic LV.  Mean LVOT gradient 26.  Peak LVOT gradient 43 mmHg.

## 2021-07-07 NOTE — HISTORY OF PRESENT ILLNESS
[FreeTextEntry1] : \par Medical history includes\par Thoracic, aortic ectasia.\par Essential hypertension. Preserved systolic function. Nonsmoker. No renal insufficiency. No congestive heart failure.\par Coronary artery disease with coronary angiography recently showing proximal LAD moderate disease. On medical management. multile angiograms. Ifr normal.\par Mild to moderate mitral regurgitation.\par Hyperlipidemia. On statin therapy.\par Hypothyroidism.

## 2021-07-07 NOTE — REASON FOR VISIT
[Symptom and Test Evaluation] : symptom and test evaluation [Structural Heart and Valve Disease] : structural heart and valve disease [Hyperlipidemia] : hyperlipidemia [Hypertension] : hypertension [Coronary Artery Disease] : coronary artery disease [Syncope] : syncope [FreeTextEntry3] : Dr. Houston [FreeTextEntry1] : 86-year-old female comes in for further evaluation management of LVOT obstruction with higher dose of metoprolol.  And follow-up limited echocardiogram.\par Since last seen she has been doing fairly well.  She has increase her fluid intake.  And tolerating metoprolol 50 mg well.\par No significant dizziness lightheadedness near syncopal or syncopal event\par She has no significant chest pain.\par Remains fairly active.  Independent.\par She has no PND, orthopnea, or pedal edema. No recent changes in medications.\par No claudication pain.\par No recent hospital admission\par Stable. Weight.\par \par \par

## 2021-07-07 NOTE — PHYSICAL EXAM
[General Appearance - Well Developed] : well developed [Normal Appearance] : normal appearance [Well Groomed] : well groomed [General Appearance - Well Nourished] : well nourished [No Deformities] : no deformities [General Appearance - In No Acute Distress] : no acute distress [Normal Conjunctiva] : the conjunctiva exhibited no abnormalities [No Oral Pallor] : no oral pallor [Normal Jugular Venous A Waves Present] : normal jugular venous A waves present [Normal Jugular Venous V Waves Present] : normal jugular venous V waves present [No Jugular Venous Gill A Waves] : no jugular venous gill A waves [Respiration, Rhythm And Depth] : normal respiratory rhythm and effort [Exaggerated Use Of Accessory Muscles For Inspiration] : no accessory muscle use [Heart Rate And Rhythm] : heart rate and rhythm were normal [Heart Sounds] : normal S1 and S2 [Arterial Pulses Normal] : the arterial pulses were normal [Edema] : no peripheral edema present [Veins - Varicosity Changes] : no varicosital changes were noted in the lower extremities [Abdomen Soft] : soft [Abnormal Walk] : normal gait [Nail Clubbing] : no clubbing of the fingernails [Cyanosis, Localized] : no localized cyanosis [Skin Color & Pigmentation] : normal skin color and pigmentation [] : no rash [Oriented To Time, Place, And Person] : oriented to person, place, and time [Affect] : the affect was normal [Mood] : the mood was normal [No Anxiety] : not feeling anxious [FreeTextEntry1] : [employer] 2-[employer]/6 [place] [employer]

## 2021-09-14 ENCOUNTER — NON-APPOINTMENT (OUTPATIENT)
Age: 86
End: 2021-09-14

## 2021-10-22 ENCOUNTER — APPOINTMENT (OUTPATIENT)
Dept: CARDIOLOGY | Facility: CLINIC | Age: 86
End: 2021-10-22
Payer: MEDICARE

## 2021-10-22 VITALS
DIASTOLIC BLOOD PRESSURE: 62 MMHG | OXYGEN SATURATION: 95 % | HEIGHT: 62 IN | TEMPERATURE: 97 F | SYSTOLIC BLOOD PRESSURE: 104 MMHG | BODY MASS INDEX: 25.95 KG/M2 | HEART RATE: 59 BPM | WEIGHT: 141 LBS

## 2021-10-22 PROCEDURE — 99214 OFFICE O/P EST MOD 30 MIN: CPT

## 2021-10-22 NOTE — REASON FOR VISIT
[Symptom and Test Evaluation] : symptom and test evaluation [Structural Heart and Valve Disease] : structural heart and valve disease [Hyperlipidemia] : hyperlipidemia [Hypertension] : hypertension [Coronary Artery Disease] : coronary artery disease [FreeTextEntry3] : Dr. Houston [FreeTextEntry1] : 86-year-old female comes in for further evaluation management of LVOT obstruction with higher dose of metoprolol.  \par Since last seen she has been doing fairly well.  She has increase her fluid intake.  And tolerating metoprolol 50 mg well.\par She has mild fuzzy feeling if she does more aggressive exercise.\par She has no significant chest pain.\par Remains fairly active.  Independent.\par She has no PND, orthopnea, or pedal edema. No recent changes in medications.\par No claudication pain.\par No recent hospital admission\par Stable. Weight.\par \par \par

## 2021-10-22 NOTE — CARDIOLOGY SUMMARY
[No Exercise Ind Arr] : no exercise induced arrhythmias [No Symptoms] : no Symptoms [___] : [unfilled] [LVEF ___%] : LVEF [unfilled]% [Mild] : mild LV dysfunction [None] : no pulmonary hypertension [Normal] : normal LA size [Moderate] : moderate mitral regurgitation [de-identified] : 6/18/2021 lvef 65% left ventricular hypertrophy.  Septal thickness 1.8 cm.  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  No significant systolic anterior motion though difficult study precludes me for evaluation it appears LV cavity narrowing due to hyperdynamic function.\par July 7, 2021.  LV ejection fraction greater than 65%.  Mean LVOT gradient 26 mmHg.  Decreasing LVOT gradient noted compared to echocardiogram from June 18, 2021.\par July 7, 2021.  Hyperdynamic LV.  Mean LVOT gradient 26.  Peak LVOT gradient 43 mmHg.

## 2021-10-22 NOTE — ASSESSMENT
[FreeTextEntry1] : Besides coronary angiography, echocardiogram, myocardial perfusion scan as reviewed above.\par Labs February 3, 2018 had shown stable CBC, PSA, and a CMP. LDL 89. HDL 64. Triglycerides 72. Total cholesterol 167\par EKG ordered and interpreted by me. Indication coronary artery disease, indication. Sinus bradycardia.\par \par Reviewed on October 9, 2019\par Stress echocardiogram August 19, 2019. Ischemic EKG after 9 minutes of modified Sebastián protocol. 10 METs of workload. Saturation normal. No significant cardiac arrhythmias. Stable. Blood pressure response.\par \par Reviewed on August 6, 2020.\par EKG.  Normal sinus rhythm poor R wave progression.  Normal intervals.\par Labs which were done on July 8, 2020 normal CBC CMP with creatinine 0.67 potassium 4.5 sodium 137 HDL 60 LDL 88 total cholesterol 161 TSH 3.07\par \par Reviewed July 7, 2021.  Echocardiogram from today reviewed.\par \par Reviewed on October 22nd 2021.\par Labs from September 2021 had shown creatinine 0.98 BUN 25 potassium 4.3 sodium 140 LFT normal HDL 65 triglycerides 129 LDL 85.

## 2021-10-22 NOTE — REVIEW OF SYSTEMS
[Joint Pain] : joint pain [Joint Stiffness] : joint stiffness [Negative] : Heme/Lymph [Dizziness] : dizziness [Tremor] : no tremor was seen [Numbness (Hypoesthesia)] : no numbness [Convulsions] : no convulsions [Tingling (Paresthesia)] : no tingling [Weakness] : no weakness [Limb Weakness (Paresis)] : no limb weakness (Paresis) [Speech Disturbance] : no speech disturbance

## 2021-10-22 NOTE — DISCUSSION/SUMMARY
[FreeTextEntry1] : 86-year-old female with above medical history and active medical problems as noted below \par #1 coronary artery disease. Moderate proximal LAD. on Medical management. Continue aspirin. Continue atorvastatin.  Beta-blockers.  Continue secondary prevention.\par #2 essential hypertension. Well controlled.  Hyperdynamic LV.  LVOT obstruction.  Improved with beta-blockers and increase fluid intake.  No significant side effects.\par Recommended her\par Continue increased fluid intake\par Continue metoprolol to 75 mg daily.  Baseline bradycardia.\par Reviewed risk benefits alternatives high risk symptoms to notify us immediately.\par Any side effects which have we have reviewed.  She will contact us immediately.\par #3 hyperlipidemia. Continue atorvastatin at 40 mg at present. Tolerating it well. Low saturated fat, carbohydrate intake.\par #4 mild-to-moderate mitral regurgitation. Thoracic aortic ectasia.  Aortic root appears to be stable..  \par \par \par Counseling regarding low saturated fat, salt and carbohydrate intake was reviewed. Active lifestyle and regular. Exercise along with weight management is advised.\par \par All the above were at length reviewed. Answered all the questions. Thank you very much for this kind referral. Please do not hesitate to give me a call for any question.\par Part of this transcription was done with voice recognition software and phonetically similar errors are common. I apologize for that. Please donot hesitate to call for any questions due to above.\par Follow-up is planned in 6 months

## 2021-10-22 NOTE — PHYSICAL EXAM
[General Appearance - Well Developed] : well developed [Normal Appearance] : normal appearance [Well Groomed] : well groomed [General Appearance - Well Nourished] : well nourished [No Deformities] : no deformities [General Appearance - In No Acute Distress] : no acute distress [Normal Conjunctiva] : the conjunctiva exhibited no abnormalities [No Oral Pallor] : no oral pallor [Normal Jugular Venous A Waves Present] : normal jugular venous A waves present [Normal Jugular Venous V Waves Present] : normal jugular venous V waves present [No Jugular Venous Gill A Waves] : no jugular venous gill A waves [Exaggerated Use Of Accessory Muscles For Inspiration] : no accessory muscle use [Respiration, Rhythm And Depth] : normal respiratory rhythm and effort [Heart Rate And Rhythm] : heart rate and rhythm were normal [Heart Sounds] : normal S1 and S2 [Arterial Pulses Normal] : the arterial pulses were normal [Edema] : no peripheral edema present [Veins - Varicosity Changes] : no varicosital changes were noted in the lower extremities [Abdomen Soft] : soft [Abnormal Walk] : normal gait [Nail Clubbing] : no clubbing of the fingernails [Cyanosis, Localized] : no localized cyanosis [Skin Color & Pigmentation] : normal skin color and pigmentation [] : no rash [Oriented To Time, Place, And Person] : oriented to person, place, and time [Affect] : the affect was normal [Mood] : the mood was normal [No Anxiety] : not feeling anxious [FreeTextEntry1] : [employer] 2-3/6 [place] [employer]

## 2022-01-02 ENCOUNTER — NON-APPOINTMENT (OUTPATIENT)
Age: 87
End: 2022-01-02

## 2022-01-06 RX ORDER — METOPROLOL SUCCINATE 50 MG/1
50 TABLET, EXTENDED RELEASE ORAL DAILY
Qty: 135 | Refills: 3 | Status: DISCONTINUED | COMMUNITY
Start: 2019-10-09 | End: 2022-01-06

## 2022-01-12 ENCOUNTER — RX RENEWAL (OUTPATIENT)
Age: 87
End: 2022-01-12

## 2022-01-12 RX ORDER — METOPROLOL SUCCINATE 25 MG/1
25 TABLET, EXTENDED RELEASE ORAL DAILY
Qty: 30 | Refills: 1 | Status: DISCONTINUED | COMMUNITY
Start: 2021-08-06 | End: 2022-01-12

## 2022-02-07 ENCOUNTER — RX RENEWAL (OUTPATIENT)
Age: 87
End: 2022-02-07

## 2022-02-13 ENCOUNTER — NON-APPOINTMENT (OUTPATIENT)
Age: 87
End: 2022-02-13

## 2022-07-08 ENCOUNTER — NON-APPOINTMENT (OUTPATIENT)
Age: 87
End: 2022-07-08

## 2022-07-08 ENCOUNTER — APPOINTMENT (OUTPATIENT)
Dept: CARDIOLOGY | Facility: CLINIC | Age: 87
End: 2022-07-08

## 2022-07-08 VITALS
HEART RATE: 63 BPM | SYSTOLIC BLOOD PRESSURE: 118 MMHG | WEIGHT: 144 LBS | DIASTOLIC BLOOD PRESSURE: 68 MMHG | OXYGEN SATURATION: 98 % | BODY MASS INDEX: 26.5 KG/M2 | HEIGHT: 62 IN

## 2022-07-08 PROCEDURE — 93000 ELECTROCARDIOGRAM COMPLETE: CPT

## 2022-07-08 PROCEDURE — 93306 TTE W/DOPPLER COMPLETE: CPT

## 2022-07-08 PROCEDURE — 99214 OFFICE O/P EST MOD 30 MIN: CPT

## 2022-07-08 NOTE — REASON FOR VISIT
[Symptom and Test Evaluation] : symptom and test evaluation [Structural Heart and Valve Disease] : structural heart and valve disease [Hyperlipidemia] : hyperlipidemia [Hypertension] : hypertension [Coronary Artery Disease] : coronary artery disease [FreeTextEntry3] : Dr. Houston [FreeTextEntry1] : 87-year-old female comes in for further evaluation management of LVOT obstruction with higher dose of metoprolol.  \par Since last seen she has been doing fairly well.  She has increase her fluid intake.  And tolerating metoprolol 50 mg well.\par Exertional dyspnea when she is walking at an incline.\par She has no significant chest pain.\par Remains fairly active.  Independent.\par She has no PND, orthopnea, or pedal edema. No recent changes in medications.\par No claudication pain.\par No recent hospital admission\par Stable. Weight.\par \par \par

## 2022-07-08 NOTE — DISCUSSION/SUMMARY
[FreeTextEntry1] : 87-year-old female with above medical history and active medical problems as noted below \par #1 coronary artery disease. Moderate proximal LAD. on Medical management. Continue aspirin. Continue atorvastatin.  Beta-blockers.  Continue secondary prevention.\par #2 essential hypertension. Well controlled.  Hyperdynamic LV.  LVOT obstruction.  Improved with beta-blockers and increase fluid intake.  No significant side effects.\par Recommended her\par Continue increased fluid intake\par Continue metoprolol 50 mg twice daily\par Reviewed risk benefits alternatives high risk symptoms to notify us immediately.\par Echocardiogram will be repeated\par #3 hyperlipidemia. Continue atorvastatin at 40 mg at present. Tolerating it well. Low saturated fat, carbohydrate intake.\par #4 mild-to-moderate mitral regurgitation. Thoracic aortic ectasia.  Aortic root appears to be stable..  Follow-up echocardiogram.\par \par \par Counseling regarding low saturated fat, salt and carbohydrate intake was reviewed. Active lifestyle and regular. Exercise along with weight management is advised.\par \par All the above were at length reviewed. Answered all the questions. Thank you very much for this kind referral. Please do not hesitate to give me a call for any question.\par Part of this transcription was done with voice recognition software and phonetically similar errors are common. I apologize for that. Please donot hesitate to call for any questions due to above.\par Follow-up is planned in 6 months

## 2022-07-08 NOTE — REVIEW OF SYSTEMS
[Joint Pain] : joint pain [Joint Stiffness] : joint stiffness [Dizziness] : dizziness [Negative] : Heme/Lymph [Tremor] : no tremor was seen [Numbness (Hypoesthesia)] : no numbness [Convulsions] : no convulsions [Tingling (Paresthesia)] : no tingling [Weakness] : no weakness [Limb Weakness (Paresis)] : no limb weakness (Paresis) [Speech Disturbance] : no speech disturbance

## 2022-07-08 NOTE — CARDIOLOGY SUMMARY
[No Exercise Ind Arr] : no exercise induced arrhythmias [No Symptoms] : no Symptoms [___] : [unfilled] [LVEF ___%] : LVEF [unfilled]% [Mild] : mild LV dysfunction [None] : no pulmonary hypertension [Normal] : normal LA size [Moderate] : moderate mitral regurgitation [de-identified] : July 8, 2022.  Normal sinus rhythm leftward axis [de-identified] : 6/18/2021 lvef 65% left ventricular hypertrophy.  Septal thickness 1.8 cm.  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  No significant systolic anterior motion though difficult study precludes me for evaluation it appears LV cavity narrowing due to hyperdynamic function.\par July 7, 2021.  LV ejection fraction greater than 65%.  Mean LVOT gradient 26 mmHg.  Decreasing LVOT gradient noted compared to echocardiogram from June 18, 2021.\par July 7, 2021.  Hyperdynamic LV.  Mean LVOT gradient 26.  Peak LVOT gradient 43 mmHg.

## 2022-07-08 NOTE — PHYSICAL EXAM
[General Appearance - Well Developed] : well developed [Normal Appearance] : normal appearance [Well Groomed] : well groomed [General Appearance - Well Nourished] : well nourished [No Deformities] : no deformities [General Appearance - In No Acute Distress] : no acute distress [Normal Jugular Venous A Waves Present] : normal jugular venous A waves present [Normal Jugular Venous V Waves Present] : normal jugular venous V waves present [No Jugular Venous Gill A Waves] : no jugular venous gill A waves [] : no respiratory distress [Respiration, Rhythm And Depth] : normal respiratory rhythm and effort [Exaggerated Use Of Accessory Muscles For Inspiration] : no accessory muscle use [Heart Rate And Rhythm] : heart rate and rhythm were normal [Heart Sounds] : normal S1 and S2 [Arterial Pulses Normal] : the arterial pulses were normal [Edema] : no peripheral edema present [Veins - Varicosity Changes] : no varicosital changes were noted in the lower extremities [Abnormal Walk] : normal gait [Nail Clubbing] : no clubbing of the fingernails [Cyanosis, Localized] : no localized cyanosis [FreeTextEntry1] : watson 2-3/6 at the base

## 2022-07-13 ENCOUNTER — APPOINTMENT (OUTPATIENT)
Dept: CARDIOLOGY | Facility: CLINIC | Age: 87
End: 2022-07-13

## 2022-07-14 ENCOUNTER — APPOINTMENT (OUTPATIENT)
Dept: CARDIOLOGY | Facility: CLINIC | Age: 87
End: 2022-07-14

## 2022-07-14 PROCEDURE — 93242 EXT ECG>48HR<7D RECORDING: CPT

## 2022-07-14 PROCEDURE — 93880 EXTRACRANIAL BILAT STUDY: CPT

## 2022-07-18 ENCOUNTER — NON-APPOINTMENT (OUTPATIENT)
Age: 87
End: 2022-07-18

## 2022-07-20 ENCOUNTER — APPOINTMENT (OUTPATIENT)
Dept: CARDIOLOGY | Facility: CLINIC | Age: 87
End: 2022-07-20

## 2022-07-20 DIAGNOSIS — H53.2 DIPLOPIA: ICD-10-CM

## 2022-07-20 PROCEDURE — 99442: CPT | Mod: 95

## 2022-07-20 NOTE — CARDIOLOGY SUMMARY
[No Exercise Ind Arr] : no exercise induced arrhythmias [No Symptoms] : no Symptoms [___] : [unfilled] [LVEF ___%] : LVEF [unfilled]% [Mild] : mild LV dysfunction [None] : no pulmonary hypertension [Normal] : normal LA size [Moderate] : moderate mitral regurgitation [de-identified] : July 8, 2022.  Normal sinus rhythm leftward axis [de-identified] : Bilateral carotid Doppler study July 14, 2022.  Mild nonobstructive disease [de-identified] : 6/18/2021 lvef 65% left ventricular hypertrophy.  Septal thickness 1.8 cm.  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  No significant systolic anterior motion though difficult study precludes me for evaluation it appears LV cavity narrowing due to hyperdynamic function.\par July 7, 2021.  LV ejection fraction greater than 65%.  Mean LVOT gradient 26 mmHg.  Decreasing LVOT gradient noted compared to echocardiogram from June 18, 2021.\par July 7, 2021.  Hyperdynamic LV.  Mean LVOT gradient 26.  Peak LVOT gradient 43 mmHg.\par July 8, 2022 EF 65 to 70% mild mitral regurgitation mild mitral stenosis mild LVOT gradient of 14 mmHg.  Hyperdynamic LV systolic function.

## 2022-07-20 NOTE — REASON FOR VISIT
[Symptom and Test Evaluation] : symptom and test evaluation [Structural Heart and Valve Disease] : structural heart and valve disease [Hyperlipidemia] : hyperlipidemia [Hypertension] : hypertension [Coronary Artery Disease] : coronary artery disease [FreeTextEntry3] : Dr. Houston [FreeTextEntry1] : Consent: Patient consented to telephone visit.\par Time: A total of 25  minutes was spent in review of pertinent medical records, discussion with the patient, evaluation of patient problem, and coordination of a care plan as part of this telephone visit.\par \par No physical examination was performed at this visit for performed virtually with exceptions as noted below.\par \par Physical examination was not performed as a  the risk of COVID-19 cross-contamination to be greater than the benefit to the patient conferred by the physical examination.\par \par 87-year-old female was seen in audio visit today.  She is at home I am in [facility] office\par Since last seen it appears she had an episode of diplopia which resolved without any vision loss or focal weakness.  Was seen by ophthalmologist.  Recommended cardiovascular work-up.\par A week ago she had multiple doctors office visits.  I was a hot day.  She did not drink enough water.  She was in the kitchen.  And she had syncopal event.  She felt slightly lightheaded and lost consciousness.\par She had injury.  She was seen in the emergency room.  Had a head CT scan and subsequently discharged home.\par She has been drinking plenty of fluids now again.  And has been maintained on medication without any recurrence.  She denies any associated chest pain palpitation or unusual shortness of breath.\par \par Exertional dyspnea when she is walking at an incline.\par Remains fairly active.  Independent.\par She has no PND, orthopnea, or pedal edema. No recent changes in medications.\par No claudication pain.\par Stable. Weight.\par \par \par

## 2022-07-20 NOTE — CARDIOLOGY SUMMARY
[No Exercise Ind Arr] : no exercise induced arrhythmias [No Symptoms] : no Symptoms [___] : [unfilled] [LVEF ___%] : LVEF [unfilled]% [Mild] : mild LV dysfunction [None] : no pulmonary hypertension [Normal] : normal LA size [Moderate] : moderate mitral regurgitation [de-identified] : July 8, 2022.  Normal sinus rhythm leftward axis [de-identified] : 6/18/2021 lvef 65% left ventricular hypertrophy.  Septal thickness 1.8 cm.  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  No significant systolic anterior motion though difficult study precludes me for evaluation it appears LV cavity narrowing due to hyperdynamic function.\par July 7, 2021.  LV ejection fraction greater than 65%.  Mean LVOT gradient 26 mmHg.  Decreasing LVOT gradient noted compared to echocardiogram from June 18, 2021.\par July 7, 2021.  Hyperdynamic LV.  Mean LVOT gradient 26.  Peak LVOT gradient 43 mmHg.\par July 8, 2022 EF 65 to 70% mild mitral regurgitation mild mitral stenosis mild LVOT gradient of 14 mmHg.  Hyperdynamic LV systolic function. [de-identified] : Bilateral carotid Doppler study July 14, 2022.  Mild nonobstructive disease

## 2022-07-20 NOTE — REVIEW OF SYSTEMS
[Joint Pain] : joint pain [Joint Stiffness] : joint stiffness [Dizziness] : dizziness [Negative] : Psychiatric [Tremor] : no tremor was seen [Numbness (Hypoesthesia)] : no numbness [Convulsions] : no convulsions [Tingling (Paresthesia)] : no tingling [Weakness] : no weakness [Limb Weakness (Paresis)] : no limb weakness (Paresis) [Speech Disturbance] : no speech disturbance [FreeTextEntry5] : As noted above [de-identified] : As noted above

## 2022-07-20 NOTE — REASON FOR VISIT
[Symptom and Test Evaluation] : symptom and test evaluation [Structural Heart and Valve Disease] : structural heart and valve disease [Hyperlipidemia] : hyperlipidemia [Hypertension] : hypertension [Coronary Artery Disease] : coronary artery disease [FreeTextEntry3] : Dr. Houston [FreeTextEntry1] : Consent: Patient consented to telephone visit.\par Time: A total of 25  minutes was spent in review of pertinent medical records, discussion with the patient, evaluation of patient problem, and coordination of a care plan as part of this telephone visit.\par \par No physical examination was performed at this visit for performed virtually with exceptions as noted below.\par \par Physical examination was not performed as a  the risk of COVID-19 cross-contamination to be greater than the benefit to the patient conferred by the physical examination.\par \par 87-year-old female was seen in audio visit today.  She is at home I am in [facility] office\par Since last seen it appears she had an episode of diplopia which resolved without any vision loss or focal weakness.  Was seen by ophthalmologist.  Recommended cardiovascular work-up.\par A week ago she had multiple doctors office visits.  I was a hot day.  She did not drink enough water.  She was in the kitchen.  And she had syncopal event.  She felt slightly lightheaded and lost consciousness.\par She had injury.  She was seen in the emergency room.  Had a head CT scan and subsequently discharged home.\par She has been drinking plenty of fluids now again.  And has been maintained on medication without any recurrence.  She denies any associated chest pain palpitation or unusual shortness of breath.\par \par Exertional dyspnea when she is walking at an incline.\par Remains fairly active.  Independent.\par She has no PND, orthopnea, or pedal edema. No recent changes in medications.\par No claudication pain.\par Stable. Weight.\par \par \par

## 2022-07-20 NOTE — ASSESSMENT
[FreeTextEntry1] : Besides coronary angiography, echocardiogram, myocardial perfusion scan as reviewed above.\par Labs February 3, 2018 had shown stable CBC, PSA, and a CMP. LDL 89. HDL 64. Triglycerides 72. Total cholesterol 167\par EKG ordered and interpreted by me. Indication coronary artery disease, indication. Sinus bradycardia.\par \par Reviewed on October 9, 2019\par Stress echocardiogram August 19, 2019. Ischemic EKG after 9 minutes of modified Sebastián protocol. 10 METs of workload. Saturation normal. No significant cardiac arrhythmias. Stable. Blood pressure response.\par \par Reviewed on August 6, 2020.\par EKG.  Normal sinus rhythm poor R wave progression.  Normal intervals.\par Labs which were done on July 8, 2020 normal CBC CMP with creatinine 0.67 potassium 4.5 sodium 137 HDL 60 LDL 88 total cholesterol 161 TSH 3.07\par \par Reviewed July 7, 2021.  Echocardiogram from today reviewed.\par \par Reviewed on October 22nd 2021.\par Labs from September 2021 had shown creatinine 0.98 BUN 25 potassium 4.3 sodium 140 LFT normal HDL 65 triglycerides 129 LDL 85.\par \par Reviewed on July 20, 2022.\par Echocardiogram and carotid Doppler study reviewed.

## 2022-07-20 NOTE — DISCUSSION/SUMMARY
[FreeTextEntry1] : 87-year-old female with above medical history and active medical problems as noted below \par #1 syncopal event.  Probably intravascular volume depletion related significant LVOT gradient.  Rule out arrhythmias.  She had event monitor on that day.  We will review it to rule out any arrhythmias.  Recommended hydration.  Continue beta-blockers.  Avoid quick changes in position.  She is avoiding to drive at present.  She will call 911 if there is recurrent event.\par 2.  Diplopia.  Carotid Doppler study and echocardiogram reviewed.  Continue aspirin and statin therapy.  Follow-up with ophthalmologist.  Event monitor to rule out any significant cardiac arrhythmias.\par #3 coronary artery disease. Moderate proximal LAD. on Medical management. Continue aspirin. Continue atorvastatin.  Beta-blockers.  Continue secondary prevention.\par #4 essential hypertension.  LVOT obstruction.  Improved with beta-blockers and increase fluid intake.  No significant side effects.\par Recommended her\par Continue increased fluid intake\par Continue metoprolol 50 mg twice daily\par Reviewed risk benefits alternatives high risk symptoms to notify us immediately.\par #5 hyperlipidemia. Continue atorvastatin at 40 mg at present. Tolerating it well. Low saturated fat, carbohydrate intake.\par #6 mild-to-moderate mitral regurgitation. Thoracic aortic ectasia.  Aortic root appears to be stable..  Stable on echocardiogram at 3.8 cm\par \par \par Counseling regarding low saturated fat, salt and carbohydrate intake was reviewed. Active lifestyle and regular. Exercise along with weight management is advised.\par \par All the above were at length reviewed. Answered all the questions. Thank you very much for this kind referral. Please do not hesitate to give me a call for any question.\par Part of this transcription was done with voice recognition software and phonetically similar errors are common. I apologize for that. Please donot hesitate to call for any questions due to above.\par \par Sincerely,\par Hannah Gonsalves MD,FACC,NOAH\par

## 2022-07-20 NOTE — REVIEW OF SYSTEMS
[Joint Pain] : joint pain [Joint Stiffness] : joint stiffness [Dizziness] : dizziness [Negative] : Psychiatric [Tremor] : no tremor was seen [Numbness (Hypoesthesia)] : no numbness [Convulsions] : no convulsions [Tingling (Paresthesia)] : no tingling [Weakness] : no weakness [Limb Weakness (Paresis)] : no limb weakness (Paresis) [Speech Disturbance] : no speech disturbance [FreeTextEntry5] : As noted above [de-identified] : As noted above

## 2022-07-27 ENCOUNTER — NON-APPOINTMENT (OUTPATIENT)
Age: 87
End: 2022-07-27

## 2022-07-27 ENCOUNTER — APPOINTMENT (OUTPATIENT)
Dept: CARDIOLOGY | Facility: CLINIC | Age: 87
End: 2022-07-27

## 2022-07-27 PROCEDURE — 93242 EXT ECG>48HR<7D RECORDING: CPT

## 2022-12-07 ENCOUNTER — RX RENEWAL (OUTPATIENT)
Age: 87
End: 2022-12-07

## 2023-03-20 ENCOUNTER — ESTABLISHED COMPREHENSIVE EXAM (OUTPATIENT)
Dept: URBAN - METROPOLITAN AREA CLINIC 68 | Facility: CLINIC | Age: 88
End: 2023-03-20

## 2023-03-20 DIAGNOSIS — H40.1131: ICD-10-CM

## 2023-03-20 PROCEDURE — 92004 COMPRE OPH EXAM NEW PT 1/>: CPT

## 2023-03-20 ASSESSMENT — VISUAL ACUITY
OD_SC: J7
OS_CC: J3
OD_CC: 20/50-1
OS_SC: 20/50-1
OS_SC: J7
OD_SC: 20/40-1
OS_PH: 20/30-1
OD_PH: 20/30-1
OD_CC: J5
OS_CC: 20/30+1

## 2023-03-20 ASSESSMENT — KERATOMETRY
OS_AXISANGLE_DEGREES: 88
OD_AXISANGLE_DEGREES: 104
OD_K1POWER_DIOPTERS: 42.75
OD_AXISANGLE2_DEGREES: 14
OS_K1POWER_DIOPTERS: 41.25
OS_AXISANGLE2_DEGREES: 178
OD_K2POWER_DIOPTERS: 43.75
OS_K2POWER_DIOPTERS: 43.50

## 2023-03-20 ASSESSMENT — TONOMETRY
OD_IOP_MMHG: 11
OS_IOP_MMHG: 10

## 2023-06-13 ENCOUNTER — NON-APPOINTMENT (OUTPATIENT)
Age: 88
End: 2023-06-13

## 2023-06-13 ENCOUNTER — APPOINTMENT (OUTPATIENT)
Dept: CARDIOLOGY | Facility: CLINIC | Age: 88
End: 2023-06-13
Payer: MEDICARE

## 2023-06-13 VITALS
BODY MASS INDEX: 26.13 KG/M2 | WEIGHT: 142 LBS | DIASTOLIC BLOOD PRESSURE: 70 MMHG | HEIGHT: 62 IN | HEART RATE: 61 BPM | SYSTOLIC BLOOD PRESSURE: 114 MMHG | OXYGEN SATURATION: 95 %

## 2023-06-13 PROCEDURE — 99214 OFFICE O/P EST MOD 30 MIN: CPT

## 2023-06-13 PROCEDURE — 93000 ELECTROCARDIOGRAM COMPLETE: CPT

## 2023-06-13 NOTE — REVIEW OF SYSTEMS
[Joint Pain] : joint pain [Joint Stiffness] : joint stiffness [Dizziness] : dizziness [Feeling Fatigued] : feeling fatigued [Tremor] : no tremor was seen [Numbness (Hypoesthesia)] : no numbness [Convulsions] : no convulsions [Tingling (Paresthesia)] : no tingling [Weakness] : no weakness [Limb Weakness (Paresis)] : no limb weakness (Paresis) [Speech Disturbance] : no speech disturbance [Negative] : ENT [FreeTextEntry2] : As per HPI [FreeTextEntry5] : As noted above [de-identified] : As noted above

## 2023-06-13 NOTE — REASON FOR VISIT
[Symptom and Test Evaluation] : symptom and test evaluation [Structural Heart and Valve Disease] : structural heart and valve disease [Hyperlipidemia] : hyperlipidemia [Hypertension] : hypertension [Coronary Artery Disease] : coronary artery disease [FreeTextEntry3] : Dr. Houston [FreeTextEntry1] : 88-year-old comes in with worsening fatigue tiredness and exertional dyspnea without any associated chest pain, PND, orthopnea, palpitation.\par No near syncopal or syncopal event\par She is very active and finds herself doing less and less over the last few months.\par No recent changes in medications.\par States no fever chills cough or rigors\par No recent hospital admission.\par Stable weight\par \par \par \par

## 2023-06-13 NOTE — DISCUSSION/SUMMARY
[FreeTextEntry1] : 88-year-old female with above medical history and active medical problems as noted below \par #1  Worsening fatigue tiredness and exertional shortness of breath.\par Labs ordered for ruling out any metabolic etiology\par Echocardiogram for LV ejection fraction evaluation.  No clinical signs of unstable CAD.\par Decrease metoprolol to 25 mg twice daily\par Hold atorvastatin for next 2 to 4 weeks and see whether any of her symptoms are related to either metabolic abnormality or medication related.  There is a possibility of worsening of LVOT gradient and associated symptoms by decreasing metoprolol.  We will be careful.\par 2.  No recurrence of syncope/diplopia.  Carotid Doppler study and echocardiogram reviewed.  Continue aspirin and statin therapy. \par #3 coronary artery disease. Moderate proximal LAD. on Medical management. Continue aspirin. Continue atorvastatin.  Beta-blockers.  Continue secondary prevention.\par #4 essential hypertension.  LVOT obstruction.  Improved with beta-blockers and increase fluid intake.  We will have to carefully follow as we are decreasing dose of beta-blockers.\par Recommended her\par Continue increased fluid intake\par Decrease metoprolol 25 mg twice daily in presence of increasing fatigue.\par If there is worsening symptoms related to LVOT gradient may need to increase dose of beta-blockers again.\par Reviewed risk benefits alternatives high risk symptoms to notify us immediately.\par #5 hyperlipidemia.  Statin management as discussed above.  If no significant improvement we will continue atorvastatin at 40 mg at present. Tolerating it well. Low saturated fat, carbohydrate intake.\par #6 mild-to-moderate mitral regurgitation. Thoracic aortic ectasia.  Aortic root appears to be stable..  Stable on echocardiogram at 3.8 cm echocardiogram will be repeated\par \par \par Counseling regarding low saturated fat, salt and carbohydrate intake was reviewed. Active lifestyle and regular. Exercise along with weight management is advised.\par \par All the above were at length reviewed. Answered all the questions. Thank you very much for this kind referral. Please do not hesitate to give me a call for any question.\par Part of this transcription was done with voice recognition software and phonetically similar errors are common. I apologize for that. Please donot hesitate to call for any questions due to above.\par \par Sincerely,\par Hannah Gonsalves MD,FACC,NOAH\par

## 2023-06-13 NOTE — ASSESSMENT
[FreeTextEntry1] : Reviewed on June 13, 2023.\par EKG as noted above\par Labs from July 2022 reviewed.

## 2023-06-13 NOTE — CARDIOLOGY SUMMARY
[No Exercise Ind Arr] : no exercise induced arrhythmias [No Symptoms] : no Symptoms [___] : [unfilled] [LVEF ___%] : LVEF [unfilled]% [Mild] : mild LV dysfunction [None] : no pulmonary hypertension [Normal] : normal LA size [Moderate] : moderate mitral regurgitation [de-identified] : Event monitor.  July 2022.  Overall normal sinus rhythm average 61 NSVT.  PSVT/atrial tachycardia.  Longest 7.6 seconds.  No significant symptoms associated with that. [de-identified] : July 8, 2022.  Normal sinus rhythm leftward axis\par June 13, 2023 sinus bradycardia [de-identified] : 6/18/2021 lvef 65% left ventricular hypertrophy.  Septal thickness 1.8 cm.  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  No significant systolic anterior motion though difficult study precludes me for evaluation it appears LV cavity narrowing due to hyperdynamic function.\par July 7, 2021.  LV ejection fraction greater than 65%.  Mean LVOT gradient 26 mmHg.  Decreasing LVOT gradient noted compared to echocardiogram from June 18, 2021.\par July 7, 2021.  Hyperdynamic LV.  Mean LVOT gradient 26.  Peak LVOT gradient 43 mmHg.\par July 8, 2022 EF 65 to 70% mild mitral regurgitation mild mitral stenosis mild LVOT gradient of 14 mmHg.  Hyperdynamic LV systolic function. [de-identified] : Bilateral carotid Doppler study July 14, 2022.  Mild nonobstructive disease

## 2023-06-13 NOTE — PHYSICAL EXAM
[Normal] : no edema, no cyanosis, no clubbing, no varicosities [de-identified] : Warm to touch.  No orthostatic shifts

## 2023-07-26 ENCOUNTER — APPOINTMENT (OUTPATIENT)
Dept: CARDIOLOGY | Facility: CLINIC | Age: 88
End: 2023-07-26
Payer: MEDICARE

## 2023-07-26 VITALS
SYSTOLIC BLOOD PRESSURE: 128 MMHG | DIASTOLIC BLOOD PRESSURE: 62 MMHG | OXYGEN SATURATION: 95 % | WEIGHT: 144 LBS | BODY MASS INDEX: 26.5 KG/M2 | HEIGHT: 62 IN | HEART RATE: 58 BPM

## 2023-07-26 PROCEDURE — 99215 OFFICE O/P EST HI 40 MIN: CPT

## 2023-07-26 PROCEDURE — 93306 TTE W/DOPPLER COMPLETE: CPT

## 2023-07-26 NOTE — REASON FOR VISIT
[Symptom and Test Evaluation] : symptom and test evaluation [Structural Heart and Valve Disease] : structural heart and valve disease [Hyperlipidemia] : hyperlipidemia [Hypertension] : hypertension [Coronary Artery Disease] : coronary artery disease [FreeTextEntry3] : Dr. Houston [FreeTextEntry1] : 88-year-old comes in with worsening fatigue tiredness and exertional dyspnea with now jaw pain and sometimes ear pain.  Consistently and getting worse and its happening while walking on on level ground going to  her mail.\par No near syncopal or syncopal event\par She is very active and finds herself doing less and less over the last few months.\par No recent changes in medications.\par States no fever chills cough or rigors\par No recent hospital admission.\par Stable weight\par \par \par \par

## 2023-07-26 NOTE — REVIEW OF SYSTEMS
[Feeling Fatigued] : feeling fatigued [Joint Pain] : joint pain [Joint Stiffness] : joint stiffness [Dizziness] : dizziness [Negative] : Psychiatric [Tremor] : no tremor was seen [Numbness (Hypoesthesia)] : no numbness [Convulsions] : no convulsions [Tingling (Paresthesia)] : no tingling [Weakness] : no weakness [Limb Weakness (Paresis)] : no limb weakness (Paresis) [Speech Disturbance] : no speech disturbance [FreeTextEntry2] : As per HPI [FreeTextEntry5] : As noted above [de-identified] : As noted above

## 2023-07-26 NOTE — ASSESSMENT
[FreeTextEntry1] : Reviewed on June 13, 2023.\par EKG as noted above\par Labs from July 2022 reviewed.\par \par Reviewed on July 26, 2023.  Echocardiogram from July 26, 2023 reviewed.\par Labs June 17, 2023 reviewed

## 2023-07-26 NOTE — DISCUSSION/SUMMARY
[FreeTextEntry1] : 88-year-old female with above medical history and active medical problems as noted below \par #1  Worsening fatigue tiredness and exertional shortness of breath.  And exertional angina equivalent.  There is also worsening of LVOT gradient noted.\par Labs without significant signs of metabolic abnormality.\par Metoprolol will be at 75 mg twice daily.\par Hold atorvastatin for next 2 to 4 weeks and see whether any of her symptoms are related to either metabolic abnormality or medication related.  There is a possibility of worsening of LVOT gradient and associated symptoms by decreasing metoprolol.  We will be careful.\par 2.  Worsening LVOT gradient.  Management as discussed\par #3 coronary artery disease. Moderate proximal LAD. on Medical management.  Exertional angina at low level of activity.  No metabolic abnormality.  There is increasing LVOT gradient.  Continue aspirin. Continue atorvastatin.  Beta-blockers.  Continue secondary prevention.  Risk benefits alternatives of coronary angiography again reviewed considering proximal LAD about 60% stenosis for years ago.  She understands agrees with the management plan.  In spite of her age considering low level of functional status with anginal symptoms it would be prudent to consider intervention as unable to use further antianginal therapy like nitroglycerin.\par #4 essential hypertension.  LVOT obstruction.  Improved with beta-blockers and increase fluid intake.  We will have to carefully follow as we are decreasing dose of beta-blockers.\par Recommended her\par Continue increased fluid intake\par Metoprolol at a higher dose\par If there is worsening symptoms related to LVOT gradient may need to increase dose of beta-blockers again.\par Reviewed risk benefits alternatives high risk symptoms to notify us immediately.\par #5 hyperlipidemia.  Statin management as discussed above.  If no significant improvement we will continue atorvastatin at 40 mg at present. Tolerating it well. Low saturated fat, carbohydrate intake.\par #6 mild-to-moderate mitral regurgitation. Thoracic aortic ectasia.  Aortic root appears to be stable..  Stable on echocardiogram at 3.8 cm echocardiogram will be repeated\par \par \par Counseling regarding low saturated fat, salt and carbohydrate intake was reviewed. Active lifestyle and regular. Exercise along with weight management is advised.\par \par All the above were at length reviewed. Answered all the questions. Thank you very much for this kind referral. Please do not hesitate to give me a call for any question.\par Part of this transcription was done with voice recognition software and phonetically similar errors are common. I apologize for that. Please donot hesitate to call for any questions due to above.\par \par Sincerely,\par Hannah Gonsalves MD,FACC,FASE\par

## 2023-07-26 NOTE — PHYSICAL EXAM
[Normal] : no edema, no cyanosis, no clubbing, no varicosities [de-identified] : Warm to touch.  No orthostatic shifts

## 2023-07-26 NOTE — CARDIOLOGY SUMMARY
[No Exercise Ind Arr] : no exercise induced arrhythmias [No Symptoms] : no Symptoms [___] : [unfilled] [LVEF ___%] : LVEF [unfilled]% [Mild] : mild LV dysfunction [None] : no pulmonary hypertension [Normal] : normal LA size [Moderate] : moderate mitral regurgitation [___] : [unfilled] [de-identified] : July 8, 2022.  Normal sinus rhythm leftward axis\par June 13, 2023 sinus bradycardia [de-identified] : Event monitor.  July 2022.  Overall normal sinus rhythm average 61 NSVT.  PSVT/atrial tachycardia.  Longest 7.6 seconds.  No significant symptoms associated with that. [de-identified] : 6/18/2021 lvef 65% left ventricular hypertrophy.  Septal thickness 1.8 cm.  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  No significant systolic anterior motion though difficult study precludes me for evaluation it appears LV cavity narrowing due to hyperdynamic function.\par July 7, 2021.  LV ejection fraction greater than 65%.  Mean LVOT gradient 26 mmHg.  Decreasing LVOT gradient noted compared to echocardiogram from June 18, 2021.\par July 7, 2021.  Hyperdynamic LV.  Mean LVOT gradient 26.  Peak LVOT gradient 43 mmHg.\par July 8, 2022 EF 65 to 70% mild mitral regurgitation mild mitral stenosis mild LVOT gradient of 14 mmHg.  Hyperdynamic LV systolic function.\par July 26, 2023.  EF 65 to 70% hyperdynamic.  Resting LVOT obstruction 35 mmHg increased to 73 mmHg with Valsalva.  Moderate mitral regurgitation. [de-identified] : Bilateral carotid Doppler study July 14, 2022.  Mild nonobstructive disease

## 2023-08-08 ENCOUNTER — APPOINTMENT (OUTPATIENT)
Dept: CARDIOLOGY | Facility: CLINIC | Age: 88
End: 2023-08-08
Payer: MEDICARE

## 2023-08-08 VITALS
SYSTOLIC BLOOD PRESSURE: 112 MMHG | BODY MASS INDEX: 26.5 KG/M2 | DIASTOLIC BLOOD PRESSURE: 60 MMHG | WEIGHT: 144 LBS | HEART RATE: 65 BPM | HEIGHT: 62 IN | OXYGEN SATURATION: 99 %

## 2023-08-08 DIAGNOSIS — R06.02 SHORTNESS OF BREATH: ICD-10-CM

## 2023-08-08 DIAGNOSIS — Z95.5 PRESENCE OF CORONARY ANGIOPLASTY IMPLANT AND GRAFT: ICD-10-CM

## 2023-08-08 PROCEDURE — 99214 OFFICE O/P EST MOD 30 MIN: CPT

## 2023-08-08 NOTE — PHYSICAL EXAM
[Well Developed] : well developed [Well Nourished] : well nourished [No Acute Distress] : no acute distress [Normal Conjunctiva] : normal conjunctiva [Normal Venous Pressure] : normal venous pressure [No Carotid Bruit] : no carotid bruit [Normal S1, S2] : normal S1, S2 [No Murmur] : no murmur [No Rub] : no rub [No Gallop] : no gallop [Clear Lung Fields] : clear lung fields [Good Air Entry] : good air entry [No Respiratory Distress] : no respiratory distress  [Soft] : abdomen soft [Non Tender] : non-tender [No Masses/organomegaly] : no masses/organomegaly [Normal Bowel Sounds] : normal bowel sounds [Abnormal Gait] : abnormal gait [No Edema] : no edema [No Cyanosis] : no cyanosis [No Clubbing] : no clubbing [No Varicosities] : no varicosities [No Rash] : no rash [No Skin Lesions] : no skin lesions [Moves all extremities] : moves all extremities [No Focal Deficits] : no focal deficits [Normal Speech] : normal speech [Alert and Oriented] : alert and oriented [Normal memory] : normal memory [de-identified] : Right radial cath site c/d/i. 2+ pulse with ulnar occlusion. No hematoma or swelling. Right femoral cath site c/d/i. No hematoma or swelling. 2+ distal pulses. [de-identified] : With cane

## 2023-08-08 NOTE — HISTORY OF PRESENT ILLNESS
[FreeTextEntry1] : 88F w/ PMH of CAD - pLAD lesion, thoracic aortic ectasia, HTN, HLD, mild to moderate MR presenting for post-cath follow up.  I had difficulty with brachial vein access and I had to abort and access her femoral vein.  She is having significant bruising at the cath sites but without swelling or pain.  She has had no further chest pain or breathing episodes.  She has tried a deep breathing exercise which seems to help her out.   8/8/23: Underwent cardiac cath and PCI to her proximal and mid LAD.  Radial access was not useable and I had to convert to femoral.   Symptoms have improved already post-PCI.  Only complaint is wrist soreness from the access site.

## 2023-08-08 NOTE — REASON FOR VISIT
[Hyperlipidemia] : hyperlipidemia [Hypertension] : hypertension [Coronary Artery Disease] : coronary artery disease [Friend] : friend

## 2023-08-15 ENCOUNTER — NON-APPOINTMENT (OUTPATIENT)
Age: 88
End: 2023-08-15

## 2023-08-29 ENCOUNTER — APPOINTMENT (OUTPATIENT)
Dept: CARDIOLOGY | Facility: CLINIC | Age: 88
End: 2023-08-29

## 2023-09-01 ENCOUNTER — APPOINTMENT (OUTPATIENT)
Dept: CARDIOLOGY | Facility: CLINIC | Age: 88
End: 2023-09-01
Payer: MEDICARE

## 2023-09-01 VITALS
HEART RATE: 60 BPM | BODY MASS INDEX: 25.76 KG/M2 | SYSTOLIC BLOOD PRESSURE: 126 MMHG | OXYGEN SATURATION: 97 % | HEIGHT: 62 IN | DIASTOLIC BLOOD PRESSURE: 70 MMHG | WEIGHT: 140 LBS

## 2023-09-01 PROCEDURE — 99214 OFFICE O/P EST MOD 30 MIN: CPT

## 2023-09-01 NOTE — REASON FOR VISIT
[Symptom and Test Evaluation] : symptom and test evaluation [Structural Heart and Valve Disease] : structural heart and valve disease [Hyperlipidemia] : hyperlipidemia [Hypertension] : hypertension [Coronary Artery Disease] : coronary artery disease [FreeTextEntry3] : Dr. Houston [FreeTextEntry1] : 88-year-old female is seen in the office for consultation.  Recently while at cardiac rehab when she started doing more aggressive exercise she was noted to have flushed sensation blurring of the vision.  She did not have any chest pain.  Denies any palpitation dizziness near syncopal or syncopal event. Her symptoms are not at moderate level of workload.  And no symptoms at home as prior to her coronary intervention to LAD. She has no PND orthopnea pedal edema She has no significant bleeding I reviewed labs from August 1, 2023 and cardiac rehab form filled out.

## 2023-09-01 NOTE — ASSESSMENT
[FreeTextEntry1] : Reviewed on June 13, 2023. EKG as noted above Labs from July 2022 reviewed.  Reviewed on July 26, 2023.  Echocardiogram from July 26, 2023 reviewed. Labs June 17, 2023 reviewed  Reviewed on September 1, 2023. Labs from August 1, 2023 stable CBC except macrocytosis stable CMP were noted.

## 2023-09-01 NOTE — PHYSICAL EXAM
[Normal] : no edema, no cyanosis, no clubbing, no varicosities [Normal S1, S2] : normal S1, S2 [No Rub] : no rub [No Gallop] : no gallop [de-identified] : Warm to touch.  No orthostatic shifts [de-identified] : Systolic ejection murmur 2/6 at the base.

## 2023-09-01 NOTE — CARDIOLOGY SUMMARY
[No Exercise Ind Arr] : no exercise induced arrhythmias [No Symptoms] : no Symptoms [___] : [unfilled] [LVEF ___%] : LVEF [unfilled]% [Mild] : mild LV dysfunction [None] : no pulmonary hypertension [Normal] : normal LA size [Moderate] : moderate mitral regurgitation [de-identified] : July 8, 2022.  Normal sinus rhythm leftward axis\par  June 13, 2023 sinus bradycardia [de-identified] : Event monitor.  July 2022.  Overall normal sinus rhythm average 61 NSVT.  PSVT/atrial tachycardia.  Longest 7.6 seconds.  No significant symptoms associated with that. [de-identified] : 6/18/2021 lvef 65% left ventricular hypertrophy.  Septal thickness 1.8 cm.  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  No significant systolic anterior motion though difficult study precludes me for evaluation it appears LV cavity narrowing due to hyperdynamic function.\par  July 7, 2021.  LV ejection fraction greater than 65%.  Mean LVOT gradient 26 mmHg.  Decreasing LVOT gradient noted compared to echocardiogram from June 18, 2021.\par  July 7, 2021.  Hyperdynamic LV.  Mean LVOT gradient 26.  Peak LVOT gradient 43 mmHg.\par  July 8, 2022 EF 65 to 70% mild mitral regurgitation mild mitral stenosis mild LVOT gradient of 14 mmHg.  Hyperdynamic LV systolic function.\par  July 26, 2023.  EF 65 to 70% hyperdynamic.  Resting LVOT obstruction 35 mmHg increased to 73 mmHg with Valsalva.  Moderate mitral regurgitation. [de-identified] : Bilateral carotid Doppler study July 14, 2022.  Mild nonobstructive disease

## 2023-09-01 NOTE — REVIEW OF SYSTEMS
[Feeling Fatigued] : feeling fatigued [Joint Pain] : joint pain [Joint Stiffness] : joint stiffness [Dizziness] : dizziness [Negative] : Psychiatric [Tremor] : no tremor was seen [Numbness (Hypoesthesia)] : no numbness [Convulsions] : no convulsions [Tingling (Paresthesia)] : no tingling [Weakness] : no weakness [Limb Weakness (Paresis)] : no limb weakness (Paresis) [Speech Disturbance] : no speech disturbance [Easy Bleeding] : a tendency for easy bleeding [FreeTextEntry2] : As per HPI [FreeTextEntry5] : As noted above [de-identified] : As noted above

## 2023-09-01 NOTE — DISCUSSION/SUMMARY
[FreeTextEntry1] : 88-year-old female with above medical history and active medical problems as noted below  #1  Probably related to her LVOT obstruction.  We will rule out any significant anemia or intravascular volume depletion.  Recommended not to be aggressive with her exercise still evaluation is done. Continue activity exercise to the level that she has no significant symptoms of blurring of vision flushing or dizziness. If labs are stable Would recommend to increase dose of beta-blockers for reduction in LVOT gradient with activity and exercise.  Prior to coronary intervention she was noted to have increasing LVOT gradient. # 2 coronary artery disease.  Status post proximal LAD PCI.  Continue clopidogrel for 1 year uninterrupted.  Continue aspirin. Continue atorvastatin.  Beta-blockers.  Continue secondary prevention.  If her exertional anginal symptoms are noted again recommend to contact us so that appropriate further evaluation management which may include repeat invasive coronary angiography can be done. #4 essential hypertension.  LVOT obstruction. We may have to increase dose of her beta-blockers again Based on labs and her symptoms. Continue increased fluid intake Metoprolol 50 mg 1-1/2 tablet twice daily. Reviewed risk benefits alternatives high risk symptoms to notify us immediately. #5 hyperlipidemia.  Statin management as discussed above.  If no significant improvement we will continue atorvastatin at 40 mg at present. Tolerating it well. Low saturated fat, carbohydrate intake. #6 mild-to-moderate mitral regurgitation. Thoracic aortic ectasia.  Aortic root appears to be stable..  Stable on echocardiogram.   Counseling regarding low saturated fat, salt and carbohydrate intake was reviewed. Active lifestyle and regular. Exercise along with weight management is advised.  All the above were at length reviewed. Answered all the questions. Thank you very much for this kind referral. Please do not hesitate to give me a call for any question. Part of this transcription was done with voice recognition software and phonetically similar errors are common. I apologize for that. Please donot hesitate to call for any questions due to above.  Sincerely, Hannah Gonsalves MD,FAC,NOAH

## 2023-10-02 ENCOUNTER — RX ONLY (RX ONLY)
Age: 88
End: 2023-10-02

## 2023-10-02 ENCOUNTER — OFFICE (OUTPATIENT)
Dept: URBAN - METROPOLITAN AREA CLINIC 8 | Facility: CLINIC | Age: 88
Setting detail: OPHTHALMOLOGY
End: 2023-10-02
Payer: MEDICARE

## 2023-10-02 DIAGNOSIS — H02.032: ICD-10-CM

## 2023-10-02 DIAGNOSIS — H35.371: ICD-10-CM

## 2023-10-02 DIAGNOSIS — H40.1131: ICD-10-CM

## 2023-10-02 DIAGNOSIS — Z96.1: ICD-10-CM

## 2023-10-02 DIAGNOSIS — H35.3131: ICD-10-CM

## 2023-10-02 PROCEDURE — 92004 COMPRE OPH EXAM NEW PT 1/>: CPT | Performed by: OPHTHALMOLOGY

## 2023-10-02 PROCEDURE — 92133 CPTRZD OPH DX IMG PST SGM ON: CPT | Performed by: OPHTHALMOLOGY

## 2023-10-02 ASSESSMENT — REFRACTION_CURRENTRX
OS_SPHERE: +1.00
OD_CYLINDER: -1.25
OS_AXIS: 092
OD_AXIS: 086
OD_SPHERE: PLANO
OD_VPRISM_DIRECTION: BF
OS_VPRISM_DIRECTION: BF
OS_OVR_VA: 20/
OD_OVR_VA: 20/
OS_CYLINDER: -1.00
OD_ADD: +2.50
OS_ADD: +2.50

## 2023-10-02 ASSESSMENT — VISUAL ACUITY
OD_BCVA: 20/30+2
OS_BCVA: 20/30

## 2023-10-02 ASSESSMENT — AXIALLENGTH_DERIVED
OD_AL: 23.6379
OS_AL: 23.5405
OS_AL: 23.5405
OD_AL: 23.6379
OD_AL: 23.6379
OS_AL: 23.5405

## 2023-10-02 ASSESSMENT — KERATOMETRY
METHOD_AUTO_MANUAL: AUTO
OD_K1POWER_DIOPTERS: 42.75
OS_K1POWER_DIOPTERS: 42.50
OS_K2POWER_DIOPTERS: 44.25
OS_AXISANGLE_DEGREES: 004
OD_AXISANGLE_DEGREES: 012
OD_K2POWER_DIOPTERS: 44.00

## 2023-10-02 ASSESSMENT — REFRACTION_MANIFEST
OS_CYLINDER: -1.50
OD_SPHERE: +0.75
OS_VA1: 20/25-2
OS_VA2: 20/20
OS_ADD: +2.75
OD_CYLINDER: -1.50
OS_SPHERE: +1.00
OD_VA2: 20/20
OS_VA2: 20/20
OD_ADD: +2.75
OD_VA1: 20/30+2
OU_VA: 20/25
OD_SPHERE: +0.75
OS_CYLINDER: -1.50
OS_ADD: +2.75
OD_ADD: +2.75
OS_AXIS: 090
OS_VA1: 20/25-2
OD_VA2: 20/20
OU_VA: 20/25
OD_AXIS: 100
OS_SPHERE: +1.00
OD_CYLINDER: -1.50
OS_AXIS: 090
OD_AXIS: 100
OD_VA1: 20/30+2

## 2023-10-02 ASSESSMENT — SPHEQUIV_DERIVED
OS_SPHEQUIV: 0.25
OD_SPHEQUIV: 0
OS_SPHEQUIV: 0.25
OS_SPHEQUIV: 0.25
OD_SPHEQUIV: 0
OD_SPHEQUIV: 0

## 2023-10-02 ASSESSMENT — LID POSITION - ENTROPION: OD_ENTROPION: RLL

## 2023-10-02 ASSESSMENT — REFRACTION_AUTOREFRACTION
OS_AXIS: 091
OD_SPHERE: +1.00
OD_CYLINDER: -2.00
OS_CYLINDER: -2.00
OD_AXIS: 101
OS_SPHERE: +1.25

## 2023-10-02 ASSESSMENT — TONOMETRY
OS_IOP_MMHG: 14
OD_IOP_MMHG: 14

## 2023-10-02 ASSESSMENT — LID POSITION - COMMENTS: OD_COMMENTS: INTERMITTENT ENTROPION

## 2023-10-02 ASSESSMENT — CONFRONTATIONAL VISUAL FIELD TEST (CVF)
OD_FINDINGS: FULL
OS_FINDINGS: FULL

## 2023-10-10 ENCOUNTER — APPOINTMENT (OUTPATIENT)
Dept: CARDIOLOGY | Facility: CLINIC | Age: 88
End: 2023-10-10
Payer: MEDICARE

## 2023-10-10 VITALS
HEART RATE: 62 BPM | BODY MASS INDEX: 25.76 KG/M2 | SYSTOLIC BLOOD PRESSURE: 144 MMHG | DIASTOLIC BLOOD PRESSURE: 66 MMHG | WEIGHT: 140 LBS | OXYGEN SATURATION: 96 % | HEIGHT: 62 IN

## 2023-10-10 DIAGNOSIS — Q24.8 OTHER SPECIFIED CONGENITAL MALFORMATIONS OF HEART: ICD-10-CM

## 2023-10-10 DIAGNOSIS — R55 SYNCOPE AND COLLAPSE: ICD-10-CM

## 2023-10-10 PROCEDURE — 99214 OFFICE O/P EST MOD 30 MIN: CPT

## 2023-10-17 ENCOUNTER — NON-APPOINTMENT (OUTPATIENT)
Age: 88
End: 2023-10-17

## 2023-11-08 ENCOUNTER — NON-APPOINTMENT (OUTPATIENT)
Age: 88
End: 2023-11-08

## 2023-11-09 ENCOUNTER — RX RENEWAL (OUTPATIENT)
Age: 88
End: 2023-11-09

## 2023-11-16 ENCOUNTER — NON-APPOINTMENT (OUTPATIENT)
Age: 88
End: 2023-11-16

## 2024-01-19 PROBLEM — H25.13 CATARACT NUCLEAR SCLEROSIS AGE RELATED; BOTH EYES: Status: ACTIVE | Noted: 2024-01-19

## 2024-05-08 ENCOUNTER — RX RENEWAL (OUTPATIENT)
Age: 89
End: 2024-05-08

## 2024-05-27 ENCOUNTER — RX RENEWAL (OUTPATIENT)
Age: 89
End: 2024-05-27

## 2024-06-25 ENCOUNTER — APPOINTMENT (OUTPATIENT)
Dept: CARDIOLOGY | Facility: CLINIC | Age: 89
End: 2024-06-25
Payer: MEDICARE

## 2024-06-25 DIAGNOSIS — I42.2 OTHER HYPERTROPHIC CARDIOMYOPATHY: ICD-10-CM

## 2024-06-25 DIAGNOSIS — I25.10 ATHEROSCLEROTIC HEART DISEASE OF NATIVE CORONARY ARTERY W/OUT ANGINA PECTORIS: ICD-10-CM

## 2024-06-25 DIAGNOSIS — I34.0 NONRHEUMATIC MITRAL (VALVE) INSUFFICIENCY: ICD-10-CM

## 2024-06-25 DIAGNOSIS — I77.810 THORACIC AORTIC ECTASIA: ICD-10-CM

## 2024-06-25 DIAGNOSIS — I11.9 HYPERTENSIVE HEART DISEASE W/OUT HEART FAILURE: ICD-10-CM

## 2024-06-25 DIAGNOSIS — E78.5 HYPERLIPIDEMIA, UNSPECIFIED: ICD-10-CM

## 2024-06-25 PROCEDURE — 99214 OFFICE O/P EST MOD 30 MIN: CPT

## 2024-06-25 PROCEDURE — G2211 COMPLEX E/M VISIT ADD ON: CPT

## 2024-06-25 PROCEDURE — 93000 ELECTROCARDIOGRAM COMPLETE: CPT

## 2024-06-25 RX ORDER — ASPIRIN 81 MG
81 TABLET, DELAYED RELEASE (ENTERIC COATED) ORAL DAILY
Refills: 0 | Status: DISCONTINUED | COMMUNITY
End: 2024-06-25

## 2024-06-25 RX ORDER — CLOPIDOGREL BISULFATE 75 MG/1
75 TABLET, FILM COATED ORAL DAILY
Qty: 90 | Refills: 3 | Status: ACTIVE | COMMUNITY
Start: 2023-08-08 | End: 1900-01-01

## 2024-06-25 RX ORDER — ATORVASTATIN CALCIUM 40 MG/1
40 TABLET, FILM COATED ORAL
Qty: 90 | Refills: 1 | Status: ACTIVE | COMMUNITY
Start: 2018-01-23 | End: 1900-01-01

## 2024-06-25 RX ORDER — METOPROLOL SUCCINATE 50 MG/1
50 TABLET, EXTENDED RELEASE ORAL DAILY
Qty: 135 | Refills: 3 | Status: ACTIVE | COMMUNITY
Start: 2022-01-03 | End: 1900-01-01

## 2024-07-01 ENCOUNTER — NON-APPOINTMENT (OUTPATIENT)
Age: 89
End: 2024-07-01

## 2024-07-15 ENCOUNTER — OFFICE (OUTPATIENT)
Dept: URBAN - METROPOLITAN AREA CLINIC 8 | Facility: CLINIC | Age: 89
Setting detail: OPHTHALMOLOGY
End: 2024-07-15
Payer: MEDICARE

## 2024-07-15 DIAGNOSIS — Z96.1: ICD-10-CM

## 2024-07-15 DIAGNOSIS — H35.371: ICD-10-CM

## 2024-07-15 DIAGNOSIS — H40.1131: ICD-10-CM

## 2024-07-15 DIAGNOSIS — H35.3131: ICD-10-CM

## 2024-07-15 DIAGNOSIS — H02.032: ICD-10-CM

## 2024-07-15 DIAGNOSIS — H52.4: ICD-10-CM

## 2024-07-15 PROCEDURE — 99214 OFFICE O/P EST MOD 30 MIN: CPT | Performed by: OPHTHALMOLOGY

## 2024-07-15 PROCEDURE — 92133 CPTRZD OPH DX IMG PST SGM ON: CPT | Performed by: OPHTHALMOLOGY

## 2024-07-15 PROCEDURE — 92015 DETERMINE REFRACTIVE STATE: CPT | Performed by: OPHTHALMOLOGY

## 2024-07-15 ASSESSMENT — CONFRONTATIONAL VISUAL FIELD TEST (CVF)
OS_FINDINGS: FULL
OD_FINDINGS: FULL

## 2024-07-15 ASSESSMENT — LID POSITION - ENTROPION: OD_ENTROPION: RLL

## 2024-07-15 ASSESSMENT — LID POSITION - COMMENTS: OD_COMMENTS: INTERMITTENT ENTROPION

## 2024-07-18 ENCOUNTER — NON-APPOINTMENT (OUTPATIENT)
Age: 89
End: 2024-07-18

## 2024-07-19 ENCOUNTER — APPOINTMENT (OUTPATIENT)
Dept: CARDIOLOGY | Facility: CLINIC | Age: 89
End: 2024-07-19

## 2024-07-26 ENCOUNTER — APPOINTMENT (OUTPATIENT)
Dept: CARDIOLOGY | Facility: CLINIC | Age: 89
End: 2024-07-26
Payer: MEDICARE

## 2024-07-26 VITALS
WEIGHT: 140 LBS | DIASTOLIC BLOOD PRESSURE: 70 MMHG | SYSTOLIC BLOOD PRESSURE: 124 MMHG | BODY MASS INDEX: 25.76 KG/M2 | RESPIRATION RATE: 16 BRPM | OXYGEN SATURATION: 98 % | HEIGHT: 62 IN | HEART RATE: 65 BPM

## 2024-07-26 DIAGNOSIS — I42.2 OTHER HYPERTROPHIC CARDIOMYOPATHY: ICD-10-CM

## 2024-07-26 DIAGNOSIS — E78.5 HYPERLIPIDEMIA, UNSPECIFIED: ICD-10-CM

## 2024-07-26 DIAGNOSIS — I11.9 HYPERTENSIVE HEART DISEASE W/OUT HEART FAILURE: ICD-10-CM

## 2024-07-26 DIAGNOSIS — I77.810 THORACIC AORTIC ECTASIA: ICD-10-CM

## 2024-07-26 DIAGNOSIS — Q24.8 OTHER SPECIFIED CONGENITAL MALFORMATIONS OF HEART: ICD-10-CM

## 2024-07-26 DIAGNOSIS — R06.02 SHORTNESS OF BREATH: ICD-10-CM

## 2024-07-26 DIAGNOSIS — I25.10 ATHEROSCLEROTIC HEART DISEASE OF NATIVE CORONARY ARTERY W/OUT ANGINA PECTORIS: ICD-10-CM

## 2024-07-26 PROCEDURE — 93000 ELECTROCARDIOGRAM COMPLETE: CPT

## 2024-07-26 PROCEDURE — 99215 OFFICE O/P EST HI 40 MIN: CPT

## 2024-07-26 PROCEDURE — G2211 COMPLEX E/M VISIT ADD ON: CPT

## 2024-07-26 RX ORDER — VERAPAMIL HYDROCHLORIDE 120 MG/1
120 CAPSULE, EXTENDED RELEASE ORAL
Qty: 90 | Refills: 1 | Status: ACTIVE | COMMUNITY
Start: 2024-07-26 | End: 1900-01-01

## 2024-07-26 NOTE — REVIEW OF SYSTEMS
[Feeling Fatigued] : feeling fatigued [Joint Pain] : joint pain [Joint Stiffness] : joint stiffness [Dizziness] : dizziness [Tremor] : no tremor was seen [Numbness (Hypoesthesia)] : no numbness [Convulsions] : no convulsions [Tingling (Paresthesia)] : no tingling [Weakness] : no weakness [Limb Weakness (Paresis)] : no limb weakness (Paresis) [Speech Disturbance] : no speech disturbance [Easy Bleeding] : a tendency for easy bleeding [Negative] : Psychiatric [FreeTextEntry2] : As per HPI [FreeTextEntry5] : As noted above [de-identified] : As noted above

## 2024-07-26 NOTE — PHYSICAL EXAM
[Frail] : frail [Normal Venous Pressure] : normal venous pressure [Normal S1, S2] : normal S1, S2 [Murmur] : murmur [Clear Lung Fields] : clear lung fields [Abnormal Gait] : abnormal gait [No Edema] : no edema [Normal Speech] : normal speech [de-identified] : Vital signs today weight 239 pounds blood pressure 130/70 heart rate 62 saturation 97%.

## 2024-07-26 NOTE — REASON FOR VISIT
[Symptom and Test Evaluation] : symptom and test evaluation [Structural Heart and Valve Disease] : structural heart and valve disease [Hyperlipidemia] : hyperlipidemia [Hypertension] : hypertension [Coronary Artery Disease] : coronary artery disease [FreeTextEntry3] : Dr. Houston [FreeTextEntry1] : 89-year-old is here to complain about fatigue and shortness of breath with activity which has returned.  She feels that her exertional dyspnea had resolved with coronary stents but this has now returned.  No chest pain or pressure. She has been diagnosed to have Parkinson's disease now.  She walks with a walker.  She has not had any further chest pain/chest heaviness like she had prior to PCI to LAD.  But she feels her dyspnea on exertion is similar. Coronary angiogram, July 31, 2017 was reviewed.  Proximal LAD 90%. s/p drug-eluting stent placement. No significant near syncopal or syncopal event.   She has no significant bleeding No recent hospital admission.

## 2024-07-26 NOTE — CARDIOLOGY SUMMARY
[de-identified] : July 8, 2022.  Normal sinus rhythm leftward axis June 13, 2023 sinus bradycardia June 24, 2024.  Normal sinus rhythm nonspecific T wave changes [de-identified] : Event monitor.  July 2022.  Overall normal sinus rhythm average 61 NSVT.  PSVT/atrial tachycardia.  Longest 7.6 seconds.  No significant symptoms associated with that. [de-identified] : 6/18/2021 lvef 65% left ventricular hypertrophy.  Septal thickness 1.8 cm.  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  No significant systolic anterior motion though difficult study precludes me for evaluation it appears LV cavity narrowing due to hyperdynamic function.\par  July 7, 2021.  LV ejection fraction greater than 65%.  Mean LVOT gradient 26 mmHg.  Decreasing LVOT gradient noted compared to echocardiogram from June 18, 2021.\par  July 7, 2021.  Hyperdynamic LV.  Mean LVOT gradient 26.  Peak LVOT gradient 43 mmHg.\par  July 8, 2022 EF 65 to 70% mild mitral regurgitation mild mitral stenosis mild LVOT gradient of 14 mmHg.  Hyperdynamic LV systolic function.\par  July 26, 2023.  EF 65 to 70% hyperdynamic.  Resting LVOT obstruction 35 mmHg increased to 73 mmHg with Valsalva.  Moderate mitral regurgitation. [de-identified] : Bilateral carotid Doppler study July 14, 2022.  Mild nonobstructive disease [No Exercise Ind Arr] : no exercise induced arrhythmias [No Symptoms] : no Symptoms [___] : [unfilled] [LVEF ___%] : LVEF [unfilled]% [Mild] : mild LV dysfunction [None] : no pulmonary hypertension [Normal] : normal LA size [Moderate] : moderate mitral regurgitation [___] : [unfilled]

## 2024-07-26 NOTE — ASSESSMENT
[FreeTextEntry1] : Reviewed on June 13, 2023. EKG as noted above Labs from July 2022 reviewed.  Reviewed on July 26, 2023.  Echocardiogram from July 26, 2023 reviewed. Labs June 17, 2023 reviewed  Reviewed on September 1, 2023. Labs from August 1, 2023 stable CBC except macrocytosis stable CMP were noted.  Reviewed on October 10, 2023 Labs from September 14, 2023 CBC normal sodium 141 potassium 4.7 creatinine 0.82.  N-terminal proBNP 638.  Reviewed on June 25, 2024. EKG as noted above. Labs which were done last from September were reviewed.

## 2024-07-26 NOTE — DISCUSSION/SUMMARY
[FreeTextEntry1] : 89-year-old female walked in with complaints of dyspnea with activity for the past 5 to 6 months similar to her symptoms that she had prior to her PCI.  She does not have chest discomfort.   I will change her medications to include metoprolol and verapamil combination and follow her clinically over the next 7 to 10 days to see if her symptoms improve may be from HOCM.  If there is no improvement, further CAD evaluation may be needed which the patient is agreeable to (perhaps CTA of coronaries)  Combination of LVOT obstructive disease, coronary atherosclerotic vascular disease, possible neurological problems like parkinsonian disease makes management quite difficult. She has no anemia.  Her renal function appears to be stable except mild prerenal azotemia and no clinical signs of CHF.  Her BNP level appears to be age-dependent normal. Recommended to increase fluid intake to at least 60 to 70 ounces a day. Reviewed role of alcohol septal ablation or surgical septal ablation with her which she has declined and considering her age would be at a high risk.    Continue treatment of Parkinson's disease.  Rx of coronary artery disease.  Status post proximal LAD PCI. Continue with clopidogrel long-term.  Continue atorvastatin.  Beta-blockers.  Continue secondary prevention.   Thoracic aortic ectasia.  Aortic root appears to be stable..  Follow-up echocardiogram.  Counseling regarding low saturated fat, salt and carbohydrate intake was reviewed. Active lifestyle and regular. Exercise along with weight management is advised.  Thank you for this referral and allowing me to participate in the care of this patient.  If I can be of any further help or  if you have any questions, please do not hesitate to contact me   Sincerely,  Roel Vasquez MD, [facility], NO[facility]

## 2024-08-06 ENCOUNTER — APPOINTMENT (OUTPATIENT)
Dept: CARDIOLOGY | Facility: CLINIC | Age: 89
End: 2024-08-06

## 2024-08-06 PROCEDURE — 99214 OFFICE O/P EST MOD 30 MIN: CPT

## 2024-08-06 NOTE — REASON FOR VISIT
[Symptom and Test Evaluation] : symptom and test evaluation [Structural Heart and Valve Disease] : structural heart and valve disease [Hyperlipidemia] : hyperlipidemia [Hypertension] : hypertension [Coronary Artery Disease] : coronary artery disease [FreeTextEntry3] : Dr. Houston

## 2024-08-06 NOTE — PHYSICAL EXAM
[Frail] : frail [Normal Venous Pressure] : normal venous pressure [Normal S1, S2] : normal S1, S2 [Murmur] : murmur [Clear Lung Fields] : clear lung fields [Abnormal Gait] : abnormal gait [No Edema] : no edema [Normal Speech] : normal speech [de-identified] : Vital signs today weight 239 pounds blood pressure 130/70 heart rate 62 saturation 97%.

## 2024-08-06 NOTE — REVIEW OF SYSTEMS
[Feeling Fatigued] : feeling fatigued [Joint Pain] : joint pain [Joint Stiffness] : joint stiffness [Dizziness] : dizziness [Easy Bleeding] : a tendency for easy bleeding [Negative] : Psychiatric [Tremor] : no tremor was seen [Numbness (Hypoesthesia)] : no numbness [Convulsions] : no convulsions [Tingling (Paresthesia)] : no tingling [Weakness] : no weakness [Limb Weakness (Paresis)] : no limb weakness (Paresis) [Speech Disturbance] : no speech disturbance [FreeTextEntry2] : As per HPI [FreeTextEntry5] : As noted above [de-identified] : As noted above

## 2024-08-06 NOTE — HISTORY OF PRESENT ILLNESS
[FreeTextEntry1] : KEVIN SUMNER  is a 89 year F  who presents today Aug 06, 2024 in clinical follow-up. Recently seen in the office for recurrent dyspnea on exertion. Medication changed to include metoprolol and verapamil combination. Today she offers no complaints and states she is feeling well. Walking with walker with no recent falls. With slowing down a little her symptoms of shortness of breath have resolved.   Overall he has been feeling well. There has been no recent illness or hospital stay. Asymptomatic from cardiovascular and arrhythmia standpoint.   Today she denies chest pain, pressure, unusual shortness of breath, lightheadedness, dizziness, near syncope or syncope.    Medical history includes Thoracic, aortic ectasia. Essential hypertension. Preserved systolic function. Nonsmoker. No renal insufficiency. No congestive heart failure. Coronary artery disease with coronary angiography recently showing proximal LAD moderate disease. On medical management. multile angiograms. Ifr normal. Mild to moderate mitral regurgitation. Hyperlipidemia. On statin therapy. Hypothyroidism Parkinson's disease - walks with a walker.

## 2024-08-06 NOTE — DISCUSSION/SUMMARY
[FreeTextEntry1] : KEVIN SUMNER  is a 89 year F  who presents today Aug 06, 2024 with the above history and the following active issues.   Combination of LVOT obstructive disease, coronary atherosclerotic vascular disease, possible neurological problems like parkinsonian disease makes management quite difficult. She has no anemia.  Her renal function appears to be stable except mild prerenal azotemia and no clinical signs of CHF.  Her BNP level appears to be age-dependent normal. Recommended to increase fluid intake to at least 60 to 70 ounces a day. Reviewed role of alcohol septal ablation or surgical septal ablation with her which she has declined and considering her age would be at a high risk.   Follow-up echo recommended.   Continue treatment of Parkinson's disease.  Rx of coronary artery disease.  Status post proximal LAD PCI. Continue with clopidogrel long-term.  Continue atorvastatin.  Beta-blockers.  Continue secondary prevention.   Thoracic aortic ectasia.  Aortic root appears to be stable.  Follow-up echocardiogram.  Counseling regarding low saturated fat, salt and carbohydrate intake was reviewed. Active lifestyle and regular. Exercise along with weight management is advised.  Red flag symptoms which would warrant sooner emergent evaluation reviewed with the patient.  Questions and concerns were addressed and answered.  Limitations of non-invasive testing reviewed  Sincerely,  Isadora Cancino PA-C Patients history, testing and plan reviewed with supervising MD: Dr. Hannah Gonsalves

## 2024-08-06 NOTE — PHYSICAL EXAM
[Frail] : frail [Normal Venous Pressure] : normal venous pressure [Normal S1, S2] : normal S1, S2 [Murmur] : murmur [Clear Lung Fields] : clear lung fields [Abnormal Gait] : abnormal gait [No Edema] : no edema [Normal Speech] : normal speech [de-identified] : Vital signs today weight 239 pounds blood pressure 130/70 heart rate 62 saturation 97%.

## 2024-08-06 NOTE — CARDIOLOGY SUMMARY
[No Exercise Ind Arr] : no exercise induced arrhythmias [No Symptoms] : no Symptoms [___] : [unfilled] [LVEF ___%] : LVEF [unfilled]% [Mild] : mild LV dysfunction [None] : no pulmonary hypertension [Normal] : normal LA size [Moderate] : moderate mitral regurgitation [___] : [unfilled] [de-identified] : July 8, 2022.  Normal sinus rhythm leftward axis June 13, 2023 sinus bradycardia June 24, 2024.  Normal sinus rhythm nonspecific T wave changes [de-identified] : Event monitor.  July 2022.  Overall normal sinus rhythm average 61 NSVT.  PSVT/atrial tachycardia.  Longest 7.6 seconds.  No significant symptoms associated with that. [de-identified] : 6/18/2021 lvef 65% left ventricular hypertrophy.  Septal thickness 1.8 cm.  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  No significant systolic anterior motion though difficult study precludes me for evaluation it appears LV cavity narrowing due to hyperdynamic function.\par  July 7, 2021.  LV ejection fraction greater than 65%.  Mean LVOT gradient 26 mmHg.  Decreasing LVOT gradient noted compared to echocardiogram from June 18, 2021.\par  July 7, 2021.  Hyperdynamic LV.  Mean LVOT gradient 26.  Peak LVOT gradient 43 mmHg.\par  July 8, 2022 EF 65 to 70% mild mitral regurgitation mild mitral stenosis mild LVOT gradient of 14 mmHg.  Hyperdynamic LV systolic function.\par  July 26, 2023.  EF 65 to 70% hyperdynamic.  Resting LVOT obstruction 35 mmHg increased to 73 mmHg with Valsalva.  Moderate mitral regurgitation. [de-identified] : Coronary angiogram, July 31, 2017 Proximal LAD 90%. s/p drug-eluting stent placement. [de-identified] : Bilateral carotid Doppler study July 14, 2022.  Mild nonobstructive disease

## 2024-08-06 NOTE — CARDIOLOGY SUMMARY
[No Exercise Ind Arr] : no exercise induced arrhythmias [No Symptoms] : no Symptoms [___] : [unfilled] [LVEF ___%] : LVEF [unfilled]% [Mild] : mild LV dysfunction [None] : no pulmonary hypertension [Normal] : normal LA size [Moderate] : moderate mitral regurgitation [___] : [unfilled] [de-identified] : July 8, 2022.  Normal sinus rhythm leftward axis June 13, 2023 sinus bradycardia June 24, 2024.  Normal sinus rhythm nonspecific T wave changes [de-identified] : Event monitor.  July 2022.  Overall normal sinus rhythm average 61 NSVT.  PSVT/atrial tachycardia.  Longest 7.6 seconds.  No significant symptoms associated with that. [de-identified] : 6/18/2021 lvef 65% left ventricular hypertrophy.  Septal thickness 1.8 cm.  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  No significant systolic anterior motion though difficult study precludes me for evaluation it appears LV cavity narrowing due to hyperdynamic function.\par  July 7, 2021.  LV ejection fraction greater than 65%.  Mean LVOT gradient 26 mmHg.  Decreasing LVOT gradient noted compared to echocardiogram from June 18, 2021.\par  July 7, 2021.  Hyperdynamic LV.  Mean LVOT gradient 26.  Peak LVOT gradient 43 mmHg.\par  July 8, 2022 EF 65 to 70% mild mitral regurgitation mild mitral stenosis mild LVOT gradient of 14 mmHg.  Hyperdynamic LV systolic function.\par  July 26, 2023.  EF 65 to 70% hyperdynamic.  Resting LVOT obstruction 35 mmHg increased to 73 mmHg with Valsalva.  Moderate mitral regurgitation. [de-identified] : Coronary angiogram, July 31, 2017 Proximal LAD 90%. s/p drug-eluting stent placement. [de-identified] : Bilateral carotid Doppler study July 14, 2022.  Mild nonobstructive disease

## 2024-08-06 NOTE — REVIEW OF SYSTEMS
[Feeling Fatigued] : feeling fatigued [Joint Pain] : joint pain [Joint Stiffness] : joint stiffness [Dizziness] : dizziness [Easy Bleeding] : a tendency for easy bleeding [Negative] : Psychiatric [Tremor] : no tremor was seen [Numbness (Hypoesthesia)] : no numbness [Convulsions] : no convulsions [Tingling (Paresthesia)] : no tingling [Weakness] : no weakness [Limb Weakness (Paresis)] : no limb weakness (Paresis) [Speech Disturbance] : no speech disturbance [FreeTextEntry2] : As per HPI [FreeTextEntry5] : As noted above [de-identified] : As noted above

## 2024-09-16 ENCOUNTER — APPOINTMENT (OUTPATIENT)
Dept: CARDIOLOGY | Facility: CLINIC | Age: 89
End: 2024-09-16
Payer: MEDICARE

## 2024-09-16 VITALS
OXYGEN SATURATION: 97 % | BODY MASS INDEX: 24.48 KG/M2 | SYSTOLIC BLOOD PRESSURE: 98 MMHG | HEART RATE: 72 BPM | DIASTOLIC BLOOD PRESSURE: 56 MMHG | WEIGHT: 133 LBS | HEIGHT: 62 IN

## 2024-09-16 DIAGNOSIS — I42.2 OTHER HYPERTROPHIC CARDIOMYOPATHY: ICD-10-CM

## 2024-09-16 DIAGNOSIS — E78.5 HYPERLIPIDEMIA, UNSPECIFIED: ICD-10-CM

## 2024-09-16 DIAGNOSIS — I11.9 HYPERTENSIVE HEART DISEASE W/OUT HEART FAILURE: ICD-10-CM

## 2024-09-16 PROCEDURE — 99214 OFFICE O/P EST MOD 30 MIN: CPT

## 2024-09-16 PROCEDURE — 93306 TTE W/DOPPLER COMPLETE: CPT

## 2024-09-16 NOTE — DISCUSSION/SUMMARY
[FreeTextEntry1] : KEVIN SUMNER  is a 89 year F  who presents today Sep 16, 2024 with the above history and the following active issues.   Combination of LVOT obstructive disease, coronary atherosclerotic vascular disease, possible neurological problems like parkinsonian disease makes management quite difficult. Recommended to increase fluid intake to at least 60 to 70 ounces a day. Reviewed role of alcohol septal ablation or surgical septal ablation with her which she has declined and considering her age would be at a high risk.   Increase Metoprolol to 50mg BID.   Continue treatment of Parkinson's disease.  Rx of coronary artery disease.  Status post proximal LAD PCI. Continue with clopidogrel long-term.  Continue atorvastatin.  Beta-blockers.  Continue secondary prevention.   Thoracic aortic ectasia.  Aortic root appears to be stable.  Follow-up echocardiogram.  Counseling regarding low saturated fat, salt and carbohydrate intake was reviewed. Active lifestyle and regular. Exercise along with weight management is advised.  Red flag symptoms which would warrant sooner emergent evaluation reviewed with the patient.  Questions and concerns were addressed and answered.  Limitations of non-invasive testing reviewed  Sincerely,  Isadora Cancino PA-C Patients history, testing and plan reviewed with supervising MD: Dr. Hannah Gonsalves

## 2024-09-16 NOTE — REVIEW OF SYSTEMS
[Feeling Fatigued] : feeling fatigued [Joint Pain] : joint pain [Joint Stiffness] : joint stiffness [Dizziness] : dizziness [Easy Bleeding] : a tendency for easy bleeding [Negative] : Psychiatric [Tremor] : no tremor was seen [Numbness (Hypoesthesia)] : no numbness [Convulsions] : no convulsions [Tingling (Paresthesia)] : no tingling [Weakness] : no weakness [Limb Weakness (Paresis)] : no limb weakness (Paresis) [Speech Disturbance] : no speech disturbance [FreeTextEntry2] : As per HPI [FreeTextEntry5] : As noted above [de-identified] : As noted above

## 2024-09-16 NOTE — PHYSICAL EXAM
[Frail] : frail [Normal Venous Pressure] : normal venous pressure [Normal S1, S2] : normal S1, S2 [Murmur] : murmur [Clear Lung Fields] : clear lung fields [Abnormal Gait] : abnormal gait [No Edema] : no edema [Normal Speech] : normal speech [de-identified] : Vital signs today weight 239 pounds blood pressure 130/70 heart rate 62 saturation 97%.

## 2024-09-16 NOTE — CARDIOLOGY SUMMARY
[No Exercise Ind Arr] : no exercise induced arrhythmias [No Symptoms] : no Symptoms [___] : [unfilled] [LVEF ___%] : LVEF [unfilled]% [Mild] : mild LV dysfunction [None] : no pulmonary hypertension [Normal] : normal LA size [Moderate] : moderate mitral regurgitation [de-identified] : July 8, 2022.  Normal sinus rhythm leftward axis June 13, 2023 sinus bradycardia June 24, 2024.  Normal sinus rhythm nonspecific T wave changes [de-identified] : Event monitor.  July 2022.  Overall normal sinus rhythm average 61 NSVT.  PSVT/atrial tachycardia.  Longest 7.6 seconds.  No significant symptoms associated with that. [de-identified] : 6/18/2021 lvef 65% left ventricular hypertrophy.  Septal thickness 1.8 cm.  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  No significant systolic anterior motion though difficult study precludes me for evaluation it appears LV cavity narrowing due to hyperdynamic function. July 7, 2021.  LV ejection fraction greater than 65%.  Mean LVOT gradient 26 mmHg.  Decreasing LVOT gradient noted compared to echocardiogram from June 18, 2021. July 7, 2021.  Hyperdynamic LV.  Mean LVOT gradient 26.  Peak LVOT gradient 43 mmHg. July 8, 2022 EF 65 to 70% mild mitral regurgitation mild mitral stenosis mild LVOT gradient of 14 mmHg.  Hyperdynamic LV systolic function. July 26, 2023.  EF 65 to 70% hyperdynamic.  Resting LVOT obstruction 35 mmHg increased to 73 mmHg with Valsalva.  Moderate mitral regurgitation. 9/16/2024 EF 70%, increase in LVOT obstruction [de-identified] : Coronary angiogram, July 31, 2017 Proximal LAD 90%. s/p drug-eluting stent placement. [de-identified] : Bilateral carotid Doppler study July 14, 2022.  Mild nonobstructive disease

## 2024-09-16 NOTE — PHYSICAL EXAM
[Frail] : frail [Normal Venous Pressure] : normal venous pressure [Normal S1, S2] : normal S1, S2 [Murmur] : murmur [Clear Lung Fields] : clear lung fields [Abnormal Gait] : abnormal gait [No Edema] : no edema [Normal Speech] : normal speech [de-identified] : Vital signs today weight 239 pounds blood pressure 130/70 heart rate 62 saturation 97%.

## 2024-09-16 NOTE — REVIEW OF SYSTEMS
[Feeling Fatigued] : feeling fatigued [Joint Pain] : joint pain [Joint Stiffness] : joint stiffness [Dizziness] : dizziness [Easy Bleeding] : a tendency for easy bleeding [Negative] : Psychiatric [Tremor] : no tremor was seen [Numbness (Hypoesthesia)] : no numbness [Convulsions] : no convulsions [Tingling (Paresthesia)] : no tingling [Weakness] : no weakness [Limb Weakness (Paresis)] : no limb weakness (Paresis) [Speech Disturbance] : no speech disturbance [FreeTextEntry2] : As per HPI [FreeTextEntry5] : As noted above [de-identified] : As noted above

## 2024-09-16 NOTE — CARDIOLOGY SUMMARY
[No Exercise Ind Arr] : no exercise induced arrhythmias [No Symptoms] : no Symptoms [___] : [unfilled] [LVEF ___%] : LVEF [unfilled]% [Mild] : mild LV dysfunction [None] : no pulmonary hypertension [Normal] : normal LA size [Moderate] : moderate mitral regurgitation [de-identified] : July 8, 2022.  Normal sinus rhythm leftward axis June 13, 2023 sinus bradycardia June 24, 2024.  Normal sinus rhythm nonspecific T wave changes [de-identified] : Event monitor.  July 2022.  Overall normal sinus rhythm average 61 NSVT.  PSVT/atrial tachycardia.  Longest 7.6 seconds.  No significant symptoms associated with that. [de-identified] : 6/18/2021 lvef 65% left ventricular hypertrophy.  Septal thickness 1.8 cm.  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  No significant systolic anterior motion though difficult study precludes me for evaluation it appears LV cavity narrowing due to hyperdynamic function. July 7, 2021.  LV ejection fraction greater than 65%.  Mean LVOT gradient 26 mmHg.  Decreasing LVOT gradient noted compared to echocardiogram from June 18, 2021. July 7, 2021.  Hyperdynamic LV.  Mean LVOT gradient 26.  Peak LVOT gradient 43 mmHg. July 8, 2022 EF 65 to 70% mild mitral regurgitation mild mitral stenosis mild LVOT gradient of 14 mmHg.  Hyperdynamic LV systolic function. July 26, 2023.  EF 65 to 70% hyperdynamic.  Resting LVOT obstruction 35 mmHg increased to 73 mmHg with Valsalva.  Moderate mitral regurgitation. 9/16/2024 EF 70%, increase in LVOT obstruction [de-identified] : Coronary angiogram, July 31, 2017 Proximal LAD 90%. s/p drug-eluting stent placement. [de-identified] : Bilateral carotid Doppler study July 14, 2022.  Mild nonobstructive disease

## 2024-09-16 NOTE — HISTORY OF PRESENT ILLNESS
[FreeTextEntry1] : KEVIN SUMNER  is a 89 year F  who presents today Sep 16, 2024 in clinical follow-up.  Walking with walker or walking stick with no new complaints of falls or exertional complaints. Today she offers no complaints and states she is feeling well. Walking with walker with no recent falls. With slowing down a little her symptoms of shortness of breath have resolved.   Overall he has been feeling well. There has been no recent illness or hospital stay. Asymptomatic from cardiovascular and arrhythmia standpoint.   Today she denies chest pain, pressure, unusual shortness of breath, lightheadedness, dizziness, near syncope or syncope.    Medical history includes Thoracic, aortic ectasia. Essential hypertension. Preserved systolic function. Nonsmoker. No renal insufficiency. No congestive heart failure. Coronary artery disease with coronary angiography recently showing proximal LAD moderate disease. On medical management. multile angiograms. Ifr normal. Mild to moderate mitral regurgitation. Hyperlipidemia. On statin therapy. Hypothyroidism Parkinson's disease - walks with a walker.

## 2024-10-02 ENCOUNTER — APPOINTMENT (OUTPATIENT)
Dept: CARDIOLOGY | Facility: CLINIC | Age: 89
End: 2024-10-02

## 2024-12-25 PROBLEM — F10.90 ALCOHOL USE: Status: ACTIVE | Noted: 2018-03-07

## 2025-06-13 ENCOUNTER — OFFICE (OUTPATIENT)
Dept: URBAN - METROPOLITAN AREA CLINIC 8 | Facility: CLINIC | Age: 89
Setting detail: OPHTHALMOLOGY
End: 2025-06-13
Payer: MEDICARE

## 2025-06-13 DIAGNOSIS — Z96.1: ICD-10-CM

## 2025-06-13 DIAGNOSIS — H35.371: ICD-10-CM

## 2025-06-13 DIAGNOSIS — H35.3131: ICD-10-CM

## 2025-06-13 DIAGNOSIS — H40.1131: ICD-10-CM

## 2025-06-13 DIAGNOSIS — H02.032: ICD-10-CM

## 2025-06-13 PROCEDURE — 99213 OFFICE O/P EST LOW 20 MIN: CPT | Performed by: OPTOMETRIST

## 2025-06-13 PROCEDURE — 92133 CPTRZD OPH DX IMG PST SGM ON: CPT | Performed by: OPTOMETRIST

## 2025-06-13 ASSESSMENT — REFRACTION_MANIFEST
OD_ADD: +2.75
OD_CYLINDER: -1.50
OU_VA: 20/25
OS_VA1: 20/25-2
OD_VA2: 20/20
OU_VA: 20/25
OS_VA2: 20/20
OS_VA1: 20/25-2
OS_AXIS: 090
OS_CYLINDER: -1.50
OS_ADD: +2.75
OD_VA1: 20/30+2
OS_CYLINDER: -1.50
OD_AXIS: 100
OD_SPHERE: +0.75
OD_VA1: 20/30+2
OD_VA2: 20/20
OS_VA2: 20/20
OD_SPHERE: +0.75
OD_AXIS: 100
OS_SPHERE: +1.00
OS_ADD: +2.75
OS_SPHERE: +1.00
OD_CYLINDER: -1.50
OD_ADD: +2.75
OS_AXIS: 090

## 2025-06-13 ASSESSMENT — REFRACTION_CURRENTRX
OS_CYLINDER: -1.00
OS_AXIS: 081
OS_SPHERE: +1.00
OD_ADD: +2.50
OD_CYLINDER: -1.50
OS_AXIS: 092
OD_AXIS: 097
OD_ADD: +2.75
OD_OVR_VA: 20/
OD_SPHERE: PLANO
OS_OVR_VA: 20/
OD_OVR_VA: 20/
OD_VPRISM_DIRECTION: BF
OS_VPRISM_DIRECTION: BF
OS_CYLINDER: -1.75
OD_VPRISM_DIRECTION: BF
OS_OVR_VA: 20/
OD_AXIS: 086
OS_SPHERE: +1.00
OS_VPRISM_DIRECTION: BF
OS_ADD: +2.75
OS_ADD: +2.50
OD_CYLINDER: -1.25
OD_SPHERE: +0.75

## 2025-06-13 ASSESSMENT — TONOMETRY: OS_IOP_MMHG: 11

## 2025-06-13 ASSESSMENT — REFRACTION_AUTOREFRACTION
OS_AXIS: 092
OD_AXIS: 104
OD_CYLINDER: -1.75
OS_SPHERE: +1.50
OD_SPHERE: +0.50
OS_CYLINDER: -2.25

## 2025-06-13 ASSESSMENT — LID POSITION - COMMENTS: OD_COMMENTS: INTERMITTENT ENTROPION

## 2025-06-13 ASSESSMENT — KERATOMETRY
OS_K1POWER_DIOPTERS: 42.50
OD_AXISANGLE_DEGREES: 021
OS_K2POWER_DIOPTERS: 44.25
OD_K2POWER_DIOPTERS: 44.00
OD_K1POWER_DIOPTERS: 43.00
OS_AXISANGLE_DEGREES: 006
METHOD_AUTO_MANUAL: AUTO

## 2025-06-13 ASSESSMENT — CONFRONTATIONAL VISUAL FIELD TEST (CVF)
OD_FINDINGS: FULL
OS_FINDINGS: FULL

## 2025-06-13 ASSESSMENT — VISUAL ACUITY
OD_BCVA: 20/40
OS_BCVA: 20/40

## 2025-06-13 ASSESSMENT — LID POSITION - ENTROPION: OD_ENTROPION: RLL

## 2025-06-14 NOTE — DISCUSSION/SUMMARY
Opt out
[FreeTextEntry1] : 86-year-old female with above medical history and active medical problems as noted below \par #1 coronary artery disease. Moderate proximal LAD. on Medical management. Continue aspirin. Continue atorvastatin.  Beta-blockers.  Continue secondary prevention.\par #2 essential hypertension. Well controlled.  Hyperdynamic LV.  LVOT obstruction.  Improved with beta-blockers and increase fluid intake.  No significant side effects.\par Recommended her\par Increase fluid intake\par Continue to stay off isosorbide and valsartan.\par Increase metoprolol to 75 mg daily\par Reviewed risk benefits alternatives high risk symptoms to notify us immediately.\par Any side effects which have we have reviewed.  She will contact us immediately.\par #3 hyperlipidemia. Continue atorvastatin at 40 mg at present. Tolerating it well. Low saturated fat, carbohydrate intake.\par #4 mild-to-moderate mitral regurgitation. Thoracic aortic ectasia.  Aortic root appears to be stable..  \par \par \par Counseling regarding low saturated fat, salt and carbohydrate intake was reviewed. Active lifestyle and regular. Exercise along with weight management is advised.\par \par All the above were at length reviewed. Answered all the questions. Thank you very much for this kind referral. Please do not hesitate to give me a call for any question.\par Part of this transcription was done with voice recognition software and phonetically similar errors are common. I apologize for that. Please donot hesitate to call for any questions due to above.\par Follow-up is planned in 3 months

## 2025-06-18 ENCOUNTER — APPOINTMENT (OUTPATIENT)
Dept: CARDIOLOGY | Facility: CLINIC | Age: 89
End: 2025-06-18
Payer: MEDICARE

## 2025-06-18 ENCOUNTER — NON-APPOINTMENT (OUTPATIENT)
Age: 89
End: 2025-06-18

## 2025-06-18 VITALS
BODY MASS INDEX: 24.29 KG/M2 | WEIGHT: 132 LBS | OXYGEN SATURATION: 97 % | HEART RATE: 59 BPM | DIASTOLIC BLOOD PRESSURE: 70 MMHG | HEIGHT: 62 IN | SYSTOLIC BLOOD PRESSURE: 120 MMHG

## 2025-06-18 PROCEDURE — 99214 OFFICE O/P EST MOD 30 MIN: CPT

## 2025-06-18 PROCEDURE — G2211 COMPLEX E/M VISIT ADD ON: CPT

## 2025-06-18 PROCEDURE — 93000 ELECTROCARDIOGRAM COMPLETE: CPT

## 2025-06-18 NOTE — CARDIOLOGY SUMMARY
[de-identified] : July 8, 2022.  Normal sinus rhythm leftward axis June 13, 2023 sinus bradycardia June 24, 2024.  Normal sinus rhythm nonspecific T wave changes June 18, 2025.  Sinus bradycardia nonspecific T wave changes 6/18/25 sinus bradycardia  [de-identified] : Event monitor.  July 2022.  Overall normal sinus rhythm average 61 NSVT.  PSVT/atrial tachycardia.  Longest 7.6 seconds.  No significant symptoms associated with that. [de-identified] : 6/18/2021 lvef 65% left ventricular hypertrophy.  Septal thickness 1.8 cm.  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  No significant systolic anterior motion though difficult study precludes me for evaluation it appears LV cavity narrowing due to hyperdynamic function. July 7, 2021.  LV ejection fraction greater than 65%.  Mean LVOT gradient 26 mmHg.  Decreasing LVOT gradient noted compared to echocardiogram from June 18, 2021. July 7, 2021.  Hyperdynamic LV.  Mean LVOT gradient 26.  Peak LVOT gradient 43 mmHg. July 8, 2022 EF 65 to 70% mild mitral regurgitation mild mitral stenosis mild LVOT gradient of 14 mmHg.  Hyperdynamic LV systolic function. July 26, 2023.  EF 65 to 70% hyperdynamic.  Resting LVOT obstruction 35 mmHg increased to 73 mmHg with Valsalva.  Moderate mitral regurgitation. 9/16/2024 EF 70%, increase in LVOT obstruction [de-identified] : Coronary angiogram, July 31, 2017 Proximal LAD 90%. s/p drug-eluting stent placement. [de-identified] : Bilateral carotid Doppler study July 14, 2022.  Mild nonobstructive disease

## 2025-06-18 NOTE — HISTORY OF PRESENT ILLNESS
[FreeTextEntry1] : MARY DE LA CRUZ  is a 89 year F  who presents today in clinical follow-up.  Walking with walker or walking stick with no new complaints of falls or exertional complaints. Today she offers no complaints and states she is feeling well.  no recent falls. With slowing down a little her symptoms of shortness of breath have resolved.   Overall he has been feeling well. There has been no recent illness or hospital stay. Asymptomatic from cardiovascular and arrhythmia standpoint.   Today she denies chest pain, pressure, unusual shortness of breath, lightheadedness, dizziness, near syncope or syncope.    Medical history includes Thoracic, aortic ectasia. Essential hypertension. Preserved systolic function. Nonsmoker. No renal insufficiency. No congestive heart failure. Coronary artery disease with coronary angiography recently showing proximal LAD moderate disease. On medical management. multile angiograms. Ifr normal. Mild to moderate mitral regurgitation. Hyperlipidemia. On statin therapy. Hypothyroidism Parkinson's disease - walks with a walker.

## 2025-06-18 NOTE — PHYSICAL EXAM
[No Acute Distress] : no acute distress [Normal S1, S2] : normal S1, S2 [Murmur] : murmur [Clear Lung Fields] : clear lung fields [Abnormal Gait] : abnormal gait [No Edema] : no edema [Normal Radial B/L] : normal radial B/L [Normal DP B/L] : normal DP B/L [de-identified] : kelly

## 2025-06-18 NOTE — REVIEW OF SYSTEMS
[Tremor] : no tremor was seen [Numbness (Hypoesthesia)] : no numbness [Convulsions] : no convulsions [Tingling (Paresthesia)] : no tingling [Weakness] : no weakness [Limb Weakness (Paresis)] : no limb weakness (Paresis) [Speech Disturbance] : no speech disturbance [FreeTextEntry2] : As per HPI [FreeTextEntry5] : As noted above [de-identified] : As noted above

## 2025-06-18 NOTE — REVIEW OF SYSTEMS
[Tremor] : no tremor was seen [Numbness (Hypoesthesia)] : no numbness [Convulsions] : no convulsions [Tingling (Paresthesia)] : no tingling [Weakness] : no weakness [Limb Weakness (Paresis)] : no limb weakness (Paresis) [Speech Disturbance] : no speech disturbance [FreeTextEntry2] : As per HPI [FreeTextEntry5] : As noted above [de-identified] : As noted above

## 2025-06-18 NOTE — DISCUSSION/SUMMARY
[FreeTextEntry1] : MARY DE LA CRUZ  is a 89 year F  who presents today  with the above history and the following active issues.   Combination of LVOT obstructive disease, coronary atherosclerotic vascular disease, possible neurological problems like parkinsonian disease makes management quite difficult. Continue appropriate hydration Reviewed role of alcohol septal ablation or surgical septal ablation with her which she has declined and considering her age would be at a high risk.   Continue metoprolol 50 mg twice daily and verapamil 120 mg daily  Continue treatment of Parkinson's disease.  Rx of coronary artery disease.  Status post proximal LAD PCI. Continue with clopidogrel long-term.  Continue atorvastatin.  Beta-blockers.  Continue secondary prevention.   Thoracic aortic ectasia.  Aortic root appears to be stable.  Follow-up echocardiogram.  Essential hypertension.  Stable.  Continue present regimen of medications.  Goals reviewed.  Dyslipidemia.  Reasonably well-controlled on atorvastatin.  Continue with lifestyle modifications.  Repeat labs ordered.  Prescription for medications done.  Nonrheumatic mitral and aortic insufficiency.  Follow-up echocardiogram.  Clinically no signs of significant heart failure.  sinu bradycardia: on BB/Verapamil with stable LVOT gradient .  Counseling regarding low saturated fat, salt and carbohydrate intake was reviewed. Active lifestyle and regular. Exercise along with weight management is advised.  Red flag symptoms which would warrant sooner emergent evaluation reviewed with the patient.  Questions and concerns were addressed and answered.  Limitations of non-invasive testing reviewed I have reviewed above at length. I answered all the questions. Patient verbalized understandings. Thank you very much for allowing me to participate in your patient's care. Please feel free to call me for any questions.  Sincerely,  Maeve Miller MD, FACC, [place]    [EKG obtained to assist in diagnosis and management of assessed problem(s)] : EKG obtained to assist in diagnosis and management of assessed problem(s)

## 2025-06-18 NOTE — CARDIOLOGY SUMMARY
[de-identified] : July 8, 2022.  Normal sinus rhythm leftward axis June 13, 2023 sinus bradycardia June 24, 2024.  Normal sinus rhythm nonspecific T wave changes June 18, 2025.  Sinus bradycardia nonspecific T wave changes 6/18/25 sinus bradycardia  [de-identified] : Event monitor.  July 2022.  Overall normal sinus rhythm average 61 NSVT.  PSVT/atrial tachycardia.  Longest 7.6 seconds.  No significant symptoms associated with that. [de-identified] : 6/18/2021 lvef 65% left ventricular hypertrophy.  Septal thickness 1.8 cm.  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  No significant systolic anterior motion though difficult study precludes me for evaluation it appears LV cavity narrowing due to hyperdynamic function. July 7, 2021.  LV ejection fraction greater than 65%.  Mean LVOT gradient 26 mmHg.  Decreasing LVOT gradient noted compared to echocardiogram from June 18, 2021. July 7, 2021.  Hyperdynamic LV.  Mean LVOT gradient 26.  Peak LVOT gradient 43 mmHg. July 8, 2022 EF 65 to 70% mild mitral regurgitation mild mitral stenosis mild LVOT gradient of 14 mmHg.  Hyperdynamic LV systolic function. July 26, 2023.  EF 65 to 70% hyperdynamic.  Resting LVOT obstruction 35 mmHg increased to 73 mmHg with Valsalva.  Moderate mitral regurgitation. 9/16/2024 EF 70%, increase in LVOT obstruction [de-identified] : Coronary angiogram, July 31, 2017 Proximal LAD 90%. s/p drug-eluting stent placement. [de-identified] : Bilateral carotid Doppler study July 14, 2022.  Mild nonobstructive disease

## 2025-06-18 NOTE — PHYSICAL EXAM
[No Acute Distress] : no acute distress [Normal S1, S2] : normal S1, S2 [Murmur] : murmur [Clear Lung Fields] : clear lung fields [Abnormal Gait] : abnormal gait [No Edema] : no edema [Normal Radial B/L] : normal radial B/L [Normal DP B/L] : normal DP B/L [de-identified] : kelly

## 2025-06-18 NOTE — ASSESSMENT
[FreeTextEntry1] : reviewed 6/18/25  ekg reviewed  labs 6/12/25 reviewed: cbc stable, na 138 k 4.4 creat 0.73 lft stable ldl 80 hdl 55

## 2025-07-03 ENCOUNTER — OFFICE (OUTPATIENT)
Dept: URBAN - METROPOLITAN AREA CLINIC 8 | Facility: CLINIC | Age: 89
Setting detail: OPHTHALMOLOGY
End: 2025-07-03
Payer: MEDICARE

## 2025-07-03 DIAGNOSIS — H02.002: ICD-10-CM

## 2025-07-03 PROCEDURE — 99213 OFFICE O/P EST LOW 20 MIN: CPT | Performed by: STUDENT IN AN ORGANIZED HEALTH CARE EDUCATION/TRAINING PROGRAM

## 2025-07-03 ASSESSMENT — REFRACTION_CURRENTRX
OS_AXIS: 081
OD_AXIS: 086
OS_ADD: +2.75
OD_SPHERE: PLANO
OD_CYLINDER: -1.25
OS_AXIS: 092
OS_OVR_VA: 20/
OD_CYLINDER: -1.50
OS_CYLINDER: -1.00
OS_OVR_VA: 20/
OS_SPHERE: +1.00
OD_SPHERE: +0.75
OS_SPHERE: +1.00
OD_ADD: +2.75
OS_VPRISM_DIRECTION: BF
OS_CYLINDER: -1.75
OS_ADD: +2.50
OS_VPRISM_DIRECTION: BF
OD_OVR_VA: 20/
OD_ADD: +2.50
OD_OVR_VA: 20/
OD_VPRISM_DIRECTION: BF
OD_AXIS: 097
OD_VPRISM_DIRECTION: BF

## 2025-07-03 ASSESSMENT — LID POSITION - ENTROPION: OD_ENTROPION: RLL

## 2025-07-03 ASSESSMENT — CONFRONTATIONAL VISUAL FIELD TEST (CVF)
OD_FINDINGS: FULL
OS_FINDINGS: FULL

## 2025-07-03 ASSESSMENT — LID POSITION - COMMENTS: OD_COMMENTS: INTERMITTENT ENTROPION

## 2025-07-07 ENCOUNTER — RX RENEWAL (OUTPATIENT)
Age: 89
End: 2025-07-07

## 2025-07-07 ASSESSMENT — KERATOMETRY
METHOD_AUTO_MANUAL: AUTO
OD_K2POWER_DIOPTERS: 44.00
OS_K2POWER_DIOPTERS: 44.25
OS_K1POWER_DIOPTERS: 42.50
OD_AXISANGLE_DEGREES: 021
OD_K1POWER_DIOPTERS: 43.00
OS_AXISANGLE_DEGREES: 006

## 2025-07-07 ASSESSMENT — REFRACTION_AUTOREFRACTION
OS_CYLINDER: -2.25
OD_CYLINDER: -1.75
OD_AXIS: 104
OD_SPHERE: +0.50
OS_AXIS: 092
OS_SPHERE: +1.50

## 2025-07-07 ASSESSMENT — VISUAL ACUITY
OS_BCVA: 20/40-
OD_BCVA: 20/40

## 2025-07-16 ENCOUNTER — APPOINTMENT (OUTPATIENT)
Dept: CARDIOLOGY | Facility: CLINIC | Age: 89
End: 2025-07-16

## 2025-07-16 ENCOUNTER — APPOINTMENT (OUTPATIENT)
Dept: CARDIOLOGY | Facility: CLINIC | Age: 89
End: 2025-07-16
Payer: MEDICARE

## 2025-07-16 VITALS
HEART RATE: 66 BPM | OXYGEN SATURATION: 96 % | SYSTOLIC BLOOD PRESSURE: 112 MMHG | BODY MASS INDEX: 23 KG/M2 | DIASTOLIC BLOOD PRESSURE: 52 MMHG | WEIGHT: 125 LBS | HEIGHT: 62 IN

## 2025-07-16 PROCEDURE — 99213 OFFICE O/P EST LOW 20 MIN: CPT

## 2025-07-16 PROCEDURE — G2211 COMPLEX E/M VISIT ADD ON: CPT

## 2025-07-16 NOTE — HISTORY OF PRESENT ILLNESS
[FreeTextEntry1] : MARY DE LA CRUZ  is a 89 year F  who presents today in clinical follow-up.  Walking with walker or walking stick with no new complaints of falls or exertional complaints. She came in for unclear reason to the office.  According to her she needed medication refills. Today she offers no complaints and states she is feeling well.  no recent falls. With slowing down a little her symptoms of shortness of breath have resolved.   Overall he has been feeling well. There has been no recent illness or hospital stay. Asymptomatic from cardiovascular and arrhythmia standpoint.   Today she denies chest pain, pressure, unusual shortness of breath, lightheadedness, dizziness, near syncope or syncope.    Medical history includes Thoracic, aortic ectasia. Essential hypertension. Preserved systolic function. Nonsmoker. No renal insufficiency. No congestive heart failure. Coronary artery disease with coronary angiography recently showing proximal LAD moderate disease. On medical management. multile angiograms. Ifr normal. Mild to moderate mitral regurgitation. Hyperlipidemia. On statin therapy. Hypothyroidism Parkinson's disease - walks with a walker.

## 2025-07-16 NOTE — CARDIOLOGY SUMMARY
[No Exercise Ind Arr] : no exercise induced arrhythmias [No Symptoms] : no Symptoms [___] : [unfilled] [LVEF ___%] : LVEF [unfilled]% [Mild] : mild LV dysfunction [None] : no pulmonary hypertension [Normal] : normal LA size [Moderate] : moderate mitral regurgitation [de-identified] : July 8, 2022.  Normal sinus rhythm leftward axis June 13, 2023 sinus bradycardia June 24, 2024.  Normal sinus rhythm nonspecific T wave changes June 18, 2025.  Sinus bradycardia nonspecific T wave changes 6/18/25 sinus bradycardia  [de-identified] : Event monitor.  July 2022.  Overall normal sinus rhythm average 61 NSVT.  PSVT/atrial tachycardia.  Longest 7.6 seconds.  No significant symptoms associated with that. [de-identified] : 6/18/2021 lvef 65% left ventricular hypertrophy.  Septal thickness 1.8 cm.  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  No significant systolic anterior motion though difficult study precludes me for evaluation it appears LV cavity narrowing due to hyperdynamic function. July 7, 2021.  LV ejection fraction greater than 65%.  Mean LVOT gradient 26 mmHg.  Decreasing LVOT gradient noted compared to echocardiogram from June 18, 2021. July 7, 2021.  Hyperdynamic LV.  Mean LVOT gradient 26.  Peak LVOT gradient 43 mmHg. July 8, 2022 EF 65 to 70% mild mitral regurgitation mild mitral stenosis mild LVOT gradient of 14 mmHg.  Hyperdynamic LV systolic function. July 26, 2023.  EF 65 to 70% hyperdynamic.  Resting LVOT obstruction 35 mmHg increased to 73 mmHg with Valsalva.  Moderate mitral regurgitation. 9/16/2024 EF 70%, increase in LVOT obstruction [de-identified] : Coronary angiogram, July 31, 2017 Proximal LAD 90%. s/p drug-eluting stent placement. [de-identified] : Bilateral carotid Doppler study July 14, 2022.  Mild nonobstructive disease

## 2025-07-16 NOTE — REASON FOR VISIT
[Symptom and Test Evaluation] : symptom and test evaluation [Structural Heart and Valve Disease] : structural heart and valve disease [Hyperlipidemia] : hyperlipidemia [Hypertension] : hypertension [Coronary Artery Disease] : coronary artery disease [FreeTextEntry3] : Dr. Bajwa

## 2025-07-16 NOTE — PHYSICAL EXAM
[No Acute Distress] : no acute distress [Normal S1, S2] : normal S1, S2 [Murmur] : murmur [Clear Lung Fields] : clear lung fields [Abnormal Gait] : abnormal gait [No Edema] : no edema [Normal Radial B/L] : normal radial B/L [Normal DP B/L] : normal DP B/L [de-identified] : kelly

## 2025-07-16 NOTE — DISCUSSION/SUMMARY
[FreeTextEntry1] : MARY DE LA CRUZ  is a 89 year F  who presents today  with the above history and the following active issues.   Combination of LVOT obstructive disease, coronary atherosclerotic vascular disease, possible neurological problems like parkinsonian disease makes management quite difficult. Continue appropriate hydration Reviewed role of alcohol septal ablation or surgical septal ablation with her which she has declined and considering her age would be at a high risk.   Continue metoprolol 50 mg twice daily and verapamil 120 mg daily Prescription for medications done. Labs ordered.  Continue treatment of Parkinson's disease.  Rx of coronary artery disease.  Status post proximal LAD PCI. Continue with clopidogrel long-term.  Continue atorvastatin.  Beta-blockers.  Continue secondary prevention.   Thoracic aortic ectasia.  Aortic root appears to be stable.  Follow-up echocardiogram.  Essential hypertension.  Stable.  Continue present regimen of medications.  Goals reviewed.  Dyslipidemia.  Reasonably well-controlled on atorvastatin.  Continue with lifestyle modifications.  Repeat labs ordered.  Prescription for medications done.  Nonrheumatic mitral and aortic insufficiency.  Follow-up echocardiogram.  Clinically no signs of significant heart failure.  sinu bradycardia: on BB/Verapamil with stable LVOT gradient .  Counseling regarding low saturated fat, salt and carbohydrate intake was reviewed. Active lifestyle and regular. Exercise along with weight management is advised.  Red flag symptoms which would warrant sooner emergent evaluation reviewed with the patient.  Questions and concerns were addressed and answered.  Limitations of non-invasive testing reviewed I have reviewed above at length. I answered all the questions. Patient verbalized understandings. Thank you very much for allowing me to participate in your patient's care. Please feel free to call me for any questions.  Sincerely,  Maeve Miller MD, FACC, [place]

## 2025-07-16 NOTE — REVIEW OF SYSTEMS
[Feeling Fatigued] : feeling fatigued [Joint Pain] : joint pain [Joint Stiffness] : joint stiffness [Dizziness] : dizziness [Easy Bleeding] : a tendency for easy bleeding [Negative] : Psychiatric [Tremor] : no tremor was seen [Numbness (Hypoesthesia)] : no numbness [Convulsions] : no convulsions [Tingling (Paresthesia)] : no tingling [Weakness] : no weakness [Limb Weakness (Paresis)] : no limb weakness (Paresis) [Speech Disturbance] : no speech disturbance [FreeTextEntry2] : As per HPI [FreeTextEntry5] : As noted above [de-identified] : As noted above

## 2025-07-23 PROBLEM — H10.45 ALLERGIC CONJUNCTIVITIS ; BOTH EYES: Status: ACTIVE | Noted: 2025-07-08
